# Patient Record
Sex: MALE | Race: WHITE | NOT HISPANIC OR LATINO | Employment: FULL TIME | URBAN - METROPOLITAN AREA
[De-identification: names, ages, dates, MRNs, and addresses within clinical notes are randomized per-mention and may not be internally consistent; named-entity substitution may affect disease eponyms.]

---

## 2024-06-10 ENCOUNTER — OFFICE VISIT (OUTPATIENT)
Dept: PRIMARY CARE | Facility: EXTERNAL LOCATION | Age: 44
End: 2024-06-10

## 2024-06-10 VITALS
RESPIRATION RATE: 18 BRPM | SYSTOLIC BLOOD PRESSURE: 151 MMHG | OXYGEN SATURATION: 95 % | DIASTOLIC BLOOD PRESSURE: 103 MMHG | HEART RATE: 62 BPM | TEMPERATURE: 98 F

## 2024-06-10 DIAGNOSIS — R03.0 ELEVATED BP WITHOUT DIAGNOSIS OF HYPERTENSION: ICD-10-CM
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
## 2024-06-10 RX ORDER — TRIAMCINOLONE ACETONIDE 1 MG/G
CREAM TOPICAL 2 TIMES DAILY
Qty: 15 G | Refills: 0 | Status: SHIPPED | OUTPATIENT
Start: 2024-06-10

## 2024-06-10 ASSESSMENT — ENCOUNTER SYMPTOMS
VOMITING: 0
TROUBLE SWALLOWING: 0
CHEST TIGHTNESS: 0
MYALGIAS: 0
FACIAL SWELLING: 0
FEVER: 0
SHORTNESS OF BREATH: 0
ACTIVITY CHANGE: 0
APPETITE CHANGE: 0
DIZZINESS: 0
CHILLS: 0
DIARRHEA: 0

## 2024-06-10 NOTE — PROGRESS NOTES
Subjective   Patient ID: Aryan Woods is a 43 y.o. male who presents for Insect Bite (Rt hand.  Taking benedryl but hand still swollen).    Pt presents with c/o bug bite to right hand. Has taken benadryl for the swelling x2 days with no improvement. Thinks he was bit by a fly. Mild improvement. Tightness with closing hand. Itching. No pain. No rash, numbness, tingling, fever,chills, nausea, vomiting, diarrhea, change in vision.     Incidental finding of elevated BP noted. PMH of HTN. Pt aware he is over due for annual physical.          Review of Systems   Constitutional:  Negative for activity change, appetite change, chills and fever.   HENT:  Negative for facial swelling and trouble swallowing.    Eyes:  Negative for visual disturbance.   Respiratory:  Negative for chest tightness and shortness of breath.    Cardiovascular:  Negative for chest pain.   Gastrointestinal:  Negative for diarrhea and vomiting.   Musculoskeletal:  Negative for myalgias.   Skin:  Negative for rash.   Neurological:  Negative for dizziness.       Objective   BP (!) 151/103   Pulse 62   Temp 36.7 °C (98 °F)   Resp 18   SpO2 95%     Physical Exam  Vitals and nursing note reviewed.   Eyes:      General:         Right eye: No discharge.         Left eye: No discharge.      Conjunctiva/sclera: Conjunctivae normal.      Pupils: Pupils are equal, round, and reactive to light.   Cardiovascular:      Rate and Rhythm: Normal rate and regular rhythm.      Heart sounds: No murmur heard.  Pulmonary:      Effort: Pulmonary effort is normal. No respiratory distress.      Breath sounds: No stridor. No wheezing, rhonchi or rales.   Musculoskeletal:         General: Swelling (roight hand) and tenderness present. No deformity or signs of injury.      Cervical back: Neck supple.   Lymphadenopathy:      Cervical: No cervical adenopathy.   Skin:     General: Skin is warm and dry.      Capillary Refill: Capillary refill takes less than 2 seconds.       Coloration: Skin is not jaundiced or pale.      Findings: No bruising, erythema, lesion or rash.   Neurological:      General: No focal deficit present.      Mental Status: He is alert.   Psychiatric:         Mood and Affect: Mood normal.         Behavior: Behavior normal.         Thought Content: Thought content normal.         Judgment: Judgment normal.         Assessment/Plan   Diagnoses and all orders for this visit:  Bug bite, initial encounter  -     triamcinolone (Kenalog) 0.1 % cream; Apply topically 2 times a day. Apply to affected area 1-2 times daily as needed.  Elevated BP without diagnosis of hypertension  -     Referral to Primary Care; Future    -pt encouraged to follow up with PCP regarding asymptomatic elevated BP. Education provided about long term effects of elevated BP. Verbalizes understanding.   -pt aware he is over due for annual physical. Education on importance of annual physicals provided. Pt verbalizes understanding.   -triamcinolone cream for bug bite.   -apply ice  -if s./s worsening in 2-3 days, please follow up.

## 2024-11-25 ENCOUNTER — OFFICE VISIT (OUTPATIENT)
Dept: PRIMARY CARE | Facility: CLINIC | Age: 44
End: 2024-11-25
Payer: COMMERCIAL

## 2024-11-25 ENCOUNTER — HOSPITAL ENCOUNTER (OUTPATIENT)
Dept: RADIOLOGY | Facility: CLINIC | Age: 44
Discharge: HOME | End: 2024-11-25
Payer: COMMERCIAL

## 2024-11-25 VITALS
TEMPERATURE: 96 F | OXYGEN SATURATION: 98 % | SYSTOLIC BLOOD PRESSURE: 120 MMHG | DIASTOLIC BLOOD PRESSURE: 62 MMHG | HEIGHT: 69 IN | HEART RATE: 81 BPM | WEIGHT: 185 LBS | BODY MASS INDEX: 27.4 KG/M2

## 2024-11-25 DIAGNOSIS — Z01.89 ENCOUNTER FOR ROUTINE LABORATORY TESTING: ICD-10-CM

## 2024-11-25 DIAGNOSIS — R20.2 NUMBNESS AND TINGLING: ICD-10-CM

## 2024-11-25 DIAGNOSIS — R73.9 HYPERGLYCEMIA: ICD-10-CM

## 2024-11-25 DIAGNOSIS — R20.0 NUMBNESS AND TINGLING: ICD-10-CM

## 2024-11-25 DIAGNOSIS — E78.2 MIXED HYPERLIPIDEMIA: ICD-10-CM

## 2024-11-25 DIAGNOSIS — E55.9 VITAMIN D DEFICIENCY: ICD-10-CM

## 2024-11-25 DIAGNOSIS — F41.9 ANXIETY: ICD-10-CM

## 2024-11-25 DIAGNOSIS — Z23 ENCOUNTER FOR IMMUNIZATION: ICD-10-CM

## 2024-11-25 DIAGNOSIS — R53.82 CHRONIC FATIGUE: ICD-10-CM

## 2024-11-25 DIAGNOSIS — Z00.00 ANNUAL PHYSICAL EXAM: Primary | ICD-10-CM

## 2024-11-25 PROBLEM — E78.5 HLD (HYPERLIPIDEMIA): Status: ACTIVE | Noted: 2024-11-25

## 2024-11-25 PROCEDURE — 99213 OFFICE O/P EST LOW 20 MIN: CPT | Performed by: STUDENT IN AN ORGANIZED HEALTH CARE EDUCATION/TRAINING PROGRAM

## 2024-11-25 PROCEDURE — 99396 PREV VISIT EST AGE 40-64: CPT | Performed by: STUDENT IN AN ORGANIZED HEALTH CARE EDUCATION/TRAINING PROGRAM

## 2024-11-25 PROCEDURE — 72050 X-RAY EXAM NECK SPINE 4/5VWS: CPT

## 2024-11-25 PROCEDURE — 1036F TOBACCO NON-USER: CPT | Performed by: STUDENT IN AN ORGANIZED HEALTH CARE EDUCATION/TRAINING PROGRAM

## 2024-11-25 PROCEDURE — 3008F BODY MASS INDEX DOCD: CPT | Performed by: STUDENT IN AN ORGANIZED HEALTH CARE EDUCATION/TRAINING PROGRAM

## 2024-11-25 PROCEDURE — 72050 X-RAY EXAM NECK SPINE 4/5VWS: CPT | Performed by: RADIOLOGY

## 2024-11-25 RX ORDER — MULTIVITAMIN
1 TABLET ORAL DAILY
COMMUNITY
Start: 2024-11-25

## 2024-11-25 ASSESSMENT — ENCOUNTER SYMPTOMS
PSYCHIATRIC NEGATIVE: 1
CONSTITUTIONAL NEGATIVE: 1
ENDOCRINE NEGATIVE: 1
NEUROLOGICAL NEGATIVE: 1
CARDIOVASCULAR NEGATIVE: 1
ALLERGIC/IMMUNOLOGIC NEGATIVE: 1
MUSCULOSKELETAL NEGATIVE: 1
HEMATOLOGIC/LYMPHATIC NEGATIVE: 1
GASTROINTESTINAL NEGATIVE: 1
RESPIRATORY NEGATIVE: 1
EYES NEGATIVE: 1

## 2024-11-25 ASSESSMENT — PROMIS GLOBAL HEALTH SCALE
RATE_AVERAGE_PAIN: 0
RATE_SOCIAL_SATISFACTION: FAIR
CARRYOUT_PHYSICAL_ACTIVITIES: COMPLETELY
EMOTIONAL_PROBLEMS: SOMETIMES
RATE_PHYSICAL_HEALTH: GOOD
CARRYOUT_SOCIAL_ACTIVITIES: GOOD
RATE_GENERAL_HEALTH: GOOD
RATE_AVERAGE_FATIGUE: MILD
RATE_MENTAL_HEALTH: FAIR
RATE_QUALITY_OF_LIFE: GOOD

## 2024-11-25 ASSESSMENT — PATIENT HEALTH QUESTIONNAIRE - PHQ9
1. LITTLE INTEREST OR PLEASURE IN DOING THINGS: NOT AT ALL
SUM OF ALL RESPONSES TO PHQ9 QUESTIONS 1 AND 2: 0
2. FEELING DOWN, DEPRESSED OR HOPELESS: NOT AT ALL

## 2024-11-25 ASSESSMENT — PAIN SCALES - GENERAL: PAINLEVEL_OUTOF10: 0-NO PAIN

## 2024-11-25 NOTE — PATIENT INSTRUCTIONS
- Patient noting numbness / tingling in his L-thumb and L-second digit. 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 Discussed with him that this could be related to either carpal tunnel syndrome (CTS) due to his occupation or potentially some form of radiculopathy related to cervical disc disease; although the patient denies significant neck issues or shoulder pains on either side.  - Patient noting sleep pattern changes. Waking up every night with difficulty falling back to sleep. Discussed with him that I suspect that this is related to uncontrolled anxiety / stress.  - Otherwise, the patient feels well and denies any acute symptoms / concerns at this time.  - Blood pressure at goal today.  - Encouraged continued dietary, exercise, and lifestyle modification.  - Significant medication and problem list reconciliation done today.     Discussed routine and/or preventative care with the patient as outlined below:  - Labwork:   - Patient appears to be due for labwork. Ordered today.   - Will order labwork for the patient's next appointment. Encouraged the patient to get this labwork done one week prior to the next appointment.  - Imaging:   - Patient does not appear to be due for routine imaging at this time.  - Immunizations:   - Encouraged the tetanus (TDAP) booster.  - Discussed seasonal immunizations, including the influenza and COVID-19 immunizations.

## 2024-11-25 NOTE — PROGRESS NOTES
[facility]: [facility]  PHYSICAL EXAM      Aryan Woods is a 44 y.o. male that is presenting today for Annual Exam.    Assessment/Plan   - Patient noting numbness / tingling in his L-thumb and L-second digit. 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 Discussed with him that this could be related to either carpal tunnel syndrome (CTS) due to his occupation or potentially some form of radiculopathy related to cervical disc disease; although the patient denies significant neck issues or shoulder pains on either side.  - Patient noting sleep pattern changes. Waking up every night with difficulty falling back to sleep. Discussed with him that I suspect that this is related to uncontrolled anxiety / stress.  - Otherwise, the patient feels well and denies any acute symptoms / concerns at this time.  - Blood pressure at goal today.  - Encouraged continued dietary, exercise, and lifestyle modification.  - Significant medication and problem list reconciliation done today.     Discussed routine and/or preventative care with the patient as outlined below:  - Labwork:   - Patient appears to be due for labwork. Ordered today.   - Will order labwork for the patient's next appointment. Encouraged the patient to get this labwork done one week prior to the next appointment.  - Imaging:   - Patient does not appear to be due for routine imaging at this time.  - Immunizations:   - Encouraged the tetanus (TDAP) booster.  - Discussed seasonal immunizations, including the influenza and COVID-19 immunizations.     Diagnoses and all orders for this visit:  Annual physical exam  -     Follow Up In Primary Care; Future  Encounter for routine laboratory testing  Chronic fatigue  -     CBC and Auto Differential; Future  -     Basic Metabolic Panel; Future  -     TSH with reflex to Free T4 if abnormal; Future  -     CBC and Auto Differential; Future  -     Basic Metabolic Panel; Future  -      TSH with reflex to Free T4 if abnormal; Future  Hyperglycemia  -     Hemoglobin A1C; Future  -     Hemoglobin A1C; Future  Encounter for immunization  Numbness and tingling  -     XR cervical spine 2-3 views; Future  Mixed hyperlipidemia  -     Hepatic Function Panel; Future  -     Lipid Panel; Future  -     Hepatic Function Panel; Future  -     Lipid Panel; Future  Vitamin D deficiency  -     multivitamin tablet; Take 1 tablet by mouth once daily.  -     Vitamin D 25-Hydroxy,Total (for eval of Vitamin D levels); Future  -     Vitamin D 25-Hydroxy,Total (for eval of Vitamin D levels); Future  Anxiety  -     multivitamin tablet; Take 1 tablet by mouth once daily.    Current Outpatient Medications   Medication Instructions    multivitamin tablet 1 tablet, oral, Daily    triamcinolone (Kenalog) 0.1 % cream Topical, 2 times daily, Apply to affected area 1-2 times daily as needed.     Subjective   - The patient otherwise feels well and denies any acute symptoms or concerns at this time.  - The patient denies any changes or progression of their chronic medical problems.  - The patient denies any problems or concerns with their medications.      Review of Systems   Constitutional: Negative.    HENT: Negative.     Eyes: Negative.    Respiratory: Negative.     Cardiovascular: Negative.    Gastrointestinal: Negative.    Endocrine: Negative.    Genitourinary: Negative.    Musculoskeletal: Negative.    Skin: Negative.    Allergic/Immunologic: Negative.    Neurological: Negative.    Hematological: Negative.    Psychiatric/Behavioral: Negative.     All other systems reviewed and are negative.     Objective   Vitals:    11/25/24 1607   BP: 120/62   Pulse: 81   Temp: 35.6 °C (96 °F)   SpO2: 98%     Body mass index is 27.32 kg/m².  Physical Exam  Vitals and nursing note reviewed.   Constitutional:       General: He is not in acute distress.     Appearance: Normal appearance. He is not ill-appearing.   HENT:      Head:  Normocephalic and atraumatic.      Right Ear: Tympanic membrane, ear canal and external ear normal. There is no impacted cerumen.      Left Ear: Tympanic membrane, ear canal and external ear normal. There is no impacted cerumen.      Nose: Nose normal.      Mouth/Throat:      Mouth: Mucous membranes are moist.      Pharynx: Oropharynx is clear. No oropharyngeal exudate or posterior oropharyngeal erythema.   Eyes:      General: No scleral icterus.        Right eye: No discharge.         Left eye: No discharge.      Extraocular Movements: Extraocular movements intact.      Conjunctiva/sclera: Conjunctivae normal.      Pupils: Pupils are equal, round, and reactive to light.   Neck:      Vascular: No carotid bruit.   Cardiovascular:      Rate and Rhythm: Normal rate and regular rhythm.      Pulses: Normal pulses.      Heart sounds: Normal heart sounds. No murmur heard.     No friction rub. No gallop.   Pulmonary:      Effort: Pulmonary effort is normal. No respiratory distress.      Breath sounds: Normal breath sounds.   Abdominal:      General: Abdomen is flat. Bowel sounds are normal.      Palpations: Abdomen is soft.      Tenderness: There is no abdominal tenderness.      Hernia: No hernia is present.   Musculoskeletal:         General: No swelling. Normal range of motion.      Cervical back: Normal range of motion.   Lymphadenopathy:      Cervical: No cervical adenopathy.   Skin:     General: Skin is warm and dry.      Coloration: Skin is not jaundiced.      Findings: No rash.   Neurological:      General: No focal deficit present.      Mental Status: He is alert and oriented to person, place, and time. Mental status is at baseline.   Psychiatric:         Mood and Affect: Mood normal.         Behavior: Behavior normal.       Diagnostic Results   Lab Results   Component Value Date    GLUCOSE 95 03/16/2023    CALCIUM 9.7 03/16/2023     03/16/2023    K 4.2 03/16/2023    CO2 24 03/16/2023     03/16/2023     "BUN 9 03/16/2023    CREATININE 1.0 03/16/2023     Lab Results   Component Value Date    ALT 21 03/16/2023    AST 20 03/16/2023    ALKPHOS 100 03/16/2023    BILITOT 0.5 03/16/2023     Lab Results   Component Value Date    WBC 6.8 03/16/2023    HGB 15.3 03/16/2023    HCT 46.1 03/16/2023    MCV 87.1 03/16/2023     03/16/2023     Lab Results   Component Value Date    CHOL 195 03/16/2023     Lab Results   Component Value Date    HDL 33 (L) 03/16/2023     Lab Results   Component Value Date    LDLCALC 136 (H) 03/16/2023     Lab Results   Component Value Date    TRIG 131 03/16/2023     No components found for: \"CHOLHDL\"  Lab Results   Component Value Date    HGBA1C 5.5 03/16/2023     Other labs not included in the list above were reviewed either before or during this encounter.    History   Past Medical History:   Diagnosis Date    Headache     Hypertension      History reviewed. No pertinent surgical history.  No family history on file.  Social History     Socioeconomic History    Marital status: Single     Spouse name: Not on file    Number of children: Not on file    Years of education: Not on file    Highest education level: Not on file   Occupational History    Not on file   Tobacco Use    Smoking status: Never    Smokeless tobacco: Never   Substance and Sexual Activity    Alcohol use: Yes     Alcohol/week: 6.0 standard drinks of alcohol     Types: 4 Glasses of wine, 2 Cans of beer per week    Drug use: Never    Sexual activity: Yes     Partners: Female     Birth control/protection: Condom Male   Other Topics Concern    Not on file   Social History Narrative    Not on file     Social Drivers of Health     Financial Resource Strain: Not on file   Food Insecurity: Not on file   Transportation Needs: Not on file   Physical Activity: Not on file   Stress: Not on file   Social Connections: Not on file   Intimate Partner Violence: Not on file   Housing Stability: Not on file     Allergies   Allergen Reactions    Bee " Venom Protein (Honey Bee) Anaphylaxis     Patient states he gets swollen and has shortness of breath.     Current Outpatient Medications on File Prior to Visit   Medication Sig Dispense Refill    triamcinolone (Kenalog) 0.1 % cream Apply topically 2 times a day. Apply to affected area 1-2 times daily as needed. 15 g 0     No current facility-administered medications on file prior to visit.     Immunization History   Administered Date(s) Administered    Pfizer Purple Cap SARS-CoV-2 02/26/2021, 03/26/2021, 10/05/2021     Patient's medical history was reviewed and updated either before or during this encounter.       Dell Cisneros MD

## 2024-11-26 ENCOUNTER — LAB (OUTPATIENT)
Dept: LAB | Facility: LAB | Age: 44
End: 2024-11-26
Payer: COMMERCIAL

## 2024-11-26 DIAGNOSIS — R53.82 CHRONIC FATIGUE: ICD-10-CM

## 2024-11-26 DIAGNOSIS — R73.9 HYPERGLYCEMIA: ICD-10-CM

## 2024-11-26 DIAGNOSIS — E78.2 MIXED HYPERLIPIDEMIA: ICD-10-CM

## 2024-11-26 DIAGNOSIS — E55.9 VITAMIN D DEFICIENCY: ICD-10-CM

## 2024-11-26 LAB
25(OH)D3 SERPL-MCNC: 16 NG/ML (ref 30–100)
ALBUMIN SERPL BCP-MCNC: 4.4 G/DL (ref 3.4–5)
ALP SERPL-CCNC: 63 U/L (ref 33–120)
ALT SERPL W P-5'-P-CCNC: 14 U/L (ref 10–52)
ANION GAP SERPL CALCULATED.3IONS-SCNC: 11 MMOL/L (ref 10–20)
AST SERPL W P-5'-P-CCNC: 16 U/L (ref 9–39)
BASOPHILS # BLD AUTO: 0.04 X10*3/UL (ref 0–0.1)
BASOPHILS NFR BLD AUTO: 0.7 %
BILIRUB DIRECT SERPL-MCNC: 0.1 MG/DL (ref 0–0.3)
BILIRUB SERPL-MCNC: 0.6 MG/DL (ref 0–1.2)
BUN SERPL-MCNC: 13 MG/DL (ref 6–23)
CALCIUM SERPL-MCNC: 9.8 MG/DL (ref 8.6–10.3)
CHLORIDE SERPL-SCNC: 102 MMOL/L (ref 98–107)
CHOLEST SERPL-MCNC: 182 MG/DL (ref 0–199)
CHOLEST/HDLC SERPL: 5.2 {RATIO}
CO2 SERPL-SCNC: 31 MMOL/L (ref 21–32)
CREAT SERPL-MCNC: 1.11 MG/DL (ref 0.5–1.3)
EGFRCR SERPLBLD CKD-EPI 2021: 84 ML/MIN/1.73M*2
EOSINOPHIL # BLD AUTO: 0.02 X10*3/UL (ref 0–0.7)
EOSINOPHIL NFR BLD AUTO: 0.4 %
ERYTHROCYTE [DISTWIDTH] IN BLOOD BY AUTOMATED COUNT: 12.5 % (ref 11.5–14.5)
EST. AVERAGE GLUCOSE BLD GHB EST-MCNC: 103 MG/DL
GLUCOSE SERPL-MCNC: 92 MG/DL (ref 74–99)
HBA1C MFR BLD: 5.2 %
HCT VFR BLD AUTO: 45.1 % (ref 41–52)
HDLC SERPL-MCNC: 35.1 MG/DL
HGB BLD-MCNC: 14.7 G/DL (ref 13.5–17.5)
IMM GRANULOCYTES # BLD AUTO: 0.01 X10*3/UL (ref 0–0.7)
IMM GRANULOCYTES NFR BLD AUTO: 0.2 % (ref 0–0.9)
LDLC SERPL CALC-MCNC: 130 MG/DL
LYMPHOCYTES # BLD AUTO: 1.54 X10*3/UL (ref 1.2–4.8)
LYMPHOCYTES NFR BLD AUTO: 28.7 %
MCH RBC QN AUTO: 29.1 PG (ref 26–34)
MCHC RBC AUTO-ENTMCNC: 32.6 G/DL (ref 32–36)
MCV RBC AUTO: 89 FL (ref 80–100)
MONOCYTES # BLD AUTO: 0.45 X10*3/UL (ref 0.1–1)
MONOCYTES NFR BLD AUTO: 8.4 %
NEUTROPHILS # BLD AUTO: 3.3 X10*3/UL (ref 1.2–7.7)
NEUTROPHILS NFR BLD AUTO: 61.6 %
NON HDL CHOLESTEROL: 147 MG/DL (ref 0–149)
NRBC BLD-RTO: 0 /100 WBCS (ref 0–0)
PLATELET # BLD AUTO: 315 X10*3/UL (ref 150–450)
POTASSIUM SERPL-SCNC: 4.5 MMOL/L (ref 3.5–5.3)
PROT SERPL-MCNC: 7.4 G/DL (ref 6.4–8.2)
RBC # BLD AUTO: 5.06 X10*6/UL (ref 4.5–5.9)
SODIUM SERPL-SCNC: 139 MMOL/L (ref 136–145)
TRIGL SERPL-MCNC: 84 MG/DL (ref 0–149)
TSH SERPL-ACNC: 1.31 MIU/L (ref 0.44–3.98)
VLDL: 17 MG/DL (ref 0–40)
WBC # BLD AUTO: 5.4 X10*3/UL (ref 4.4–11.3)

## 2024-11-26 PROCEDURE — 36415 COLL VENOUS BLD VENIPUNCTURE: CPT

## 2024-11-26 PROCEDURE — 80061 LIPID PANEL: CPT

## 2024-11-26 PROCEDURE — 85025 COMPLETE CBC W/AUTO DIFF WBC: CPT

## 2024-11-26 PROCEDURE — 82248 BILIRUBIN DIRECT: CPT

## 2024-11-26 PROCEDURE — 80053 COMPREHEN METABOLIC PANEL: CPT

## 2024-11-26 PROCEDURE — 83036 HEMOGLOBIN GLYCOSYLATED A1C: CPT

## 2024-11-26 PROCEDURE — 82306 VITAMIN D 25 HYDROXY: CPT

## 2024-11-26 PROCEDURE — 84443 ASSAY THYROID STIM HORMONE: CPT

## 2024-11-27 DIAGNOSIS — M50.90 CERVICAL DISC DISEASE: ICD-10-CM

## 2024-11-27 DIAGNOSIS — R20.0 NUMBNESS AND TINGLING: Primary | ICD-10-CM

## 2024-11-27 DIAGNOSIS — E55.9 VITAMIN D DEFICIENCY: Primary | ICD-10-CM

## 2024-11-27 DIAGNOSIS — R20.2 NUMBNESS AND TINGLING: Primary | ICD-10-CM

## 2024-11-27 RX ORDER — ERGOCALCIFEROL 1.25 MG/1
50000 CAPSULE ORAL WEEKLY
Qty: 12 CAPSULE | Refills: 0 | Status: SHIPPED | OUTPATIENT
Start: 2024-11-27 | End: 2025-02-19

## 2024-12-04 ENCOUNTER — APPOINTMENT (OUTPATIENT)
Dept: NEUROSURGERY | Facility: HOSPITAL | Age: 44
End: 2024-12-04
Payer: COMMERCIAL

## 2024-12-04 VITALS
BODY MASS INDEX: 27.4 KG/M2 | HEIGHT: 69 IN | RESPIRATION RATE: 16 BRPM | SYSTOLIC BLOOD PRESSURE: 141 MMHG | WEIGHT: 185 LBS | HEART RATE: 84 BPM | DIASTOLIC BLOOD PRESSURE: 95 MMHG

## 2024-12-04 DIAGNOSIS — M47.12 CERVICAL SPONDYLOSIS WITH MYELOPATHY: Primary | ICD-10-CM

## 2024-12-04 PROCEDURE — 3008F BODY MASS INDEX DOCD: CPT | Performed by: NEUROLOGICAL SURGERY

## 2024-12-04 PROCEDURE — 99202 OFFICE O/P NEW SF 15 MIN: CPT | Performed by: NEUROLOGICAL SURGERY

## 2024-12-04 PROCEDURE — 99212 OFFICE O/P EST SF 10 MIN: CPT | Performed by: NEUROLOGICAL SURGERY

## 2024-12-04 PROCEDURE — 1036F TOBACCO NON-USER: CPT | Performed by: NEUROLOGICAL SURGERY

## 2024-12-04 ASSESSMENT — ENCOUNTER SYMPTOMS
GASTROINTESTINAL NEGATIVE: 1
ALLERGIC/IMMUNOLOGIC NEGATIVE: 1
EYES NEGATIVE: 1
CONSTITUTIONAL NEGATIVE: 1
RESPIRATORY NEGATIVE: 1
ENDOCRINE NEGATIVE: 1
BACK PAIN: 1
PSYCHIATRIC NEGATIVE: 1
NUMBNESS: 1
CARDIOVASCULAR NEGATIVE: 1
HEMATOLOGIC/LYMPHATIC NEGATIVE: 1

## 2024-12-04 NOTE — PROGRESS NOTES
"Here today having numbness in the left arm and hand and the index, middle and ring finger. Has gone to the right arm . Last week it went limp. This has been for 6 months. Has not had PT or pain management.    44-year-old male presents for evaluation of bilateral arm numbness.  Has been going on for several months.  He denies pain in his neck or pain running down his arms.  At 1 point while he was straining, he felt that his complete right arm go numb.  He has also had times when he feels like his left leg is giving out on him.    Review of Systems   Constitutional: Negative.    HENT: Negative.     Eyes: Negative.    Respiratory: Negative.     Cardiovascular: Negative.    Gastrointestinal: Negative.    Endocrine: Negative.    Genitourinary: Negative.    Musculoskeletal:  Positive for back pain.   Skin: Negative.    Allergic/Immunologic: Negative.    Neurological:  Positive for numbness.   Hematological: Negative.    Psychiatric/Behavioral: Negative.         Visit Vitals  BP (!) 141/95   Pulse 84   Resp 16   Ht 1.753 m (5' 9\")   Wt 83.9 kg (185 lb)   BMI 27.32 kg/m²   Smoking Status Never   BSA 2.02 m²           Current Outpatient Medications:     ergocalciferol (Vitamin D-2) 1.25 MG (43233 UT) capsule, Take 1 capsule (50,000 Units) by mouth 1 (one) time per week., Disp: 12 capsule, Rfl: 0    multivitamin tablet, Take 1 tablet by mouth once daily., Disp: , Rfl:     triamcinolone (Kenalog) 0.1 % cream, Apply topically 2 times a day. Apply to affected area 1-2 times daily as needed., Disp: 15 g, Rfl: 0      Objective   Neurological Exam    On physical exam, the patient is alert and interactive.  Strength is full throughout.  He has diffusely increased tone.  He is hyperreflexic throughout.  Prince signs are present in both hands and clonus is present in both ankles.    Cervical spine x-rays that I personally reviewed it demonstrate evidence of spondylosis with bone spur formation.    The patient has cervical " myelopathy based on his clinical exam.  I like to obtain MRI of his cervical spine without contrast to look for ever events of spinal cord compression that explains this and see if any surgical intervention is warranted..  Referral made for physical therapy.

## 2024-12-06 ENCOUNTER — HOSPITAL ENCOUNTER (OUTPATIENT)
Dept: RADIOLOGY | Facility: CLINIC | Age: 44
Discharge: HOME | End: 2024-12-06
Payer: COMMERCIAL

## 2024-12-06 DIAGNOSIS — M47.12 CERVICAL SPONDYLOSIS WITH MYELOPATHY: ICD-10-CM

## 2024-12-06 PROCEDURE — 72141 MRI NECK SPINE W/O DYE: CPT

## 2024-12-10 ENCOUNTER — PREP FOR PROCEDURE (OUTPATIENT)
Dept: NEUROSURGERY | Facility: HOSPITAL | Age: 44
End: 2024-12-10
Payer: COMMERCIAL

## 2024-12-10 DIAGNOSIS — G95.9 CERVICAL MYELOPATHY: Primary | ICD-10-CM

## 2024-12-16 ENCOUNTER — APPOINTMENT (OUTPATIENT)
Dept: NEUROSURGERY | Facility: CLINIC | Age: 44
End: 2024-12-16
Payer: COMMERCIAL

## 2024-12-16 VITALS
BODY MASS INDEX: 26.64 KG/M2 | RESPIRATION RATE: 15 BRPM | TEMPERATURE: 98.1 F | DIASTOLIC BLOOD PRESSURE: 104 MMHG | HEART RATE: 75 BPM | WEIGHT: 179.9 LBS | SYSTOLIC BLOOD PRESSURE: 149 MMHG | HEIGHT: 69 IN

## 2024-12-16 DIAGNOSIS — R20.0 NUMBNESS AND TINGLING: ICD-10-CM

## 2024-12-16 DIAGNOSIS — M50.90 CERVICAL DISC DISEASE: ICD-10-CM

## 2024-12-16 DIAGNOSIS — M47.12 CERVICAL SPONDYLOSIS WITH MYELOPATHY: Primary | ICD-10-CM

## 2024-12-16 DIAGNOSIS — R20.2 NUMBNESS AND TINGLING: ICD-10-CM

## 2024-12-16 PROCEDURE — 99212 OFFICE O/P EST SF 10 MIN: CPT | Performed by: NEUROLOGICAL SURGERY

## 2024-12-16 PROCEDURE — 1036F TOBACCO NON-USER: CPT | Performed by: NEUROLOGICAL SURGERY

## 2024-12-16 PROCEDURE — 3008F BODY MASS INDEX DOCD: CPT | Performed by: NEUROLOGICAL SURGERY

## 2024-12-16 ASSESSMENT — PAIN SCALES - GENERAL: PAINLEVEL_OUTOF10: 0-NO PAIN

## 2024-12-16 NOTE — PROGRESS NOTES
Hx of numbness in the left arm and hand and the index, middle and ring finger. Has gone to the right arm. Had MRI and to see PT in January.     44-year-old man with cervical myelopathy returns for follow-up.  He denies any changes in his symptoms.  On physical exam, he is alert and interactive.  He moves all extremities with full strength.  He continues to have diffuse hyperreflexia.    An MRI of his cervical spine that I personally reviewed demonstrates a C4-5 disc bulge that results in severe canal stenosis and cord compression.    I again reviewed these findings with the patient.  I have offered him a C4-5 anterior cervical discectomy and fusion with donor bone and plating to take the pressure off the spinal cord.  We reviewed the risks and benefits of this procedure today.  Specifically, we discussed that the primary objective of surgery is to arrest any further deterioration.  Is unclear how much of his symptoms will improve.  The patient will contact my office when he makes a final decision about surgery.

## 2024-12-17 PROBLEM — G95.9 CERVICAL MYELOPATHY: Status: ACTIVE | Noted: 2024-12-10

## 2024-12-19 ENCOUNTER — APPOINTMENT (OUTPATIENT)
Dept: RADIOLOGY | Facility: CLINIC | Age: 44
End: 2024-12-19
Payer: COMMERCIAL

## 2024-12-23 DIAGNOSIS — M50.90 CERVICAL DISC DISEASE: ICD-10-CM

## 2024-12-23 DIAGNOSIS — M47.12 CERVICAL SPONDYLOSIS WITH MYELOPATHY: ICD-10-CM

## 2024-12-23 DIAGNOSIS — R20.2 NUMBNESS AND TINGLING: ICD-10-CM

## 2024-12-23 DIAGNOSIS — R20.0 NUMBNESS AND TINGLING: ICD-10-CM

## 2024-12-24 ENCOUNTER — APPOINTMENT (OUTPATIENT)
Dept: PRIMARY CARE | Facility: CLINIC | Age: 44
End: 2024-12-24
Payer: COMMERCIAL

## 2024-12-26 ENCOUNTER — APPOINTMENT (OUTPATIENT)
Dept: PRIMARY CARE | Facility: CLINIC | Age: 44
End: 2024-12-26
Payer: COMMERCIAL

## 2025-01-02 ENCOUNTER — CLINICAL SUPPORT (OUTPATIENT)
Dept: PREADMISSION TESTING | Facility: HOSPITAL | Age: 45
End: 2025-01-02
Payer: COMMERCIAL

## 2025-01-02 NOTE — CPM/PAT H&P
"CPM/PAT Evaluation       Name: Aryan Woods (Aryan Woods \"Viral\")  /Age: 1980/44 y.o.     {[facility] Visit Type:20802}      Chief Complaint: ***    HPI    Past Medical History:   Diagnosis Date    Anxiety     Cervical myelopathy 2024    C4-5 disc bulge    Headache     Herpes zoster     left forehead    Hyperglycemia     Hyperlipidemia     Hypertension     Vitamin D deficiency 2024       Past Surgical History:   Procedure Laterality Date    FIBULA FRACTURE SURGERY Left 2022    OTHER SURGICAL HISTORY      cyst removal on forehead       Patient  reports being sexually active and has had partner(s) who are female. He reports using the following method of birth control/protection: Condom Male.    No family history on file.    Allergies   Allergen Reactions    Bee Venom Protein (Honey Bee) Anaphylaxis     Patient states he gets swollen and has shortness of breath.       Prior to Admission medications    Medication Sig Start Date End Date Taking? Authorizing Provider   ergocalciferol (Vitamin D-2) 1.25 MG (90179 UT) capsule Take 1 capsule (50,000 Units) by mouth 1 (one) time per week. 24  Dell Cisneros MD   multivitamin tablet Take 1 tablet by mouth once daily. 24   Dell Cisneros MD   triamcinolone (Kenalog) 0.1 % cream Apply topically 2 times a day. Apply to affected area 1-2 times daily as needed. 6/10/24   GAVIN Valles-CNP        PAT ROS     PAT Physical Exam     Airway    Testing/Diagnostic:   MRI Spine 24  IMPRESSION:  * Canal and foraminal narrowing as described above  *At C4/C5, there is disc osteophyte complex with canal stenosis and  cord compression as well as associated signal change within the cord  consistent with compressive myelomalacia    24  - Hemoglobin A1C 5.2    Patient Specialist/PCP:   Data only, unable to reach patient, left VM. No medication, providers, or history verified. Information updated based " solely on chart review.        PCP  11/25/24 - Dell Cisneros MD   Annual exam, chronic fatigue, hyperglycemia, hyperlipidemia, anxiety, Vit D def.  Results note - 11/27/24 -   - Vitamin D remains significantly low. Continue multivitamin. Start Vitamin D 2,000u/day OTC. Will send three month prescription for Vitamin D as well.  - Mild cholesterol abnormalities, unchanged from prior. Does not require medication.  - Remainder of labwork unremarkable.    Neuro Surg  12/16/24 - Shaheen Garcia MD   Cervical myelopathy, diffuse hyperreflexia, C4-5 disc bulge that results in severe canal stenosis and cord compression documented by MRI      Visit Vitals  Smoking Status Never       DASI Risk Score      Flowsheet Row Questionnaire Series Submission from 12/18/2024 in [facility] with Generic Provider Christina   Can you take care of yourself (eat, dress, bathe, or use toilet)?  2.75  filed at 12/18/2024 1516   Can you walk indoors, such as around your house? 1.75  filed at 12/18/2024 1516   Can you walk a block or two on level ground?  2.75  filed at 12/18/2024 1516   Can you climb a flight of stairs or walk up a hill? 5.5  filed at 12/18/2024 1516   Can you run a short distance? 8  filed at 12/18/2024 1516   Can you do light work around the house like dusting or washing dishes? 2.7  filed at 12/18/2024 1516   Can you do moderate work around the house like vacuuming, sweeping floors or carrying groceries? 3.5  filed at 12/18/2024 1516   Can you do heavy work around the house like scrubbing floors or lifting and moving heavy furniture?  8  filed at 12/18/2024 1516   Can you do yard work like raking leaves, weeding or pushing a mower? 4.5  filed at 12/18/2024 1516   Can you have sexual relations? 5.25  filed at 12/18/2024 1516   Can you participate in moderate recreational activities like golf, bowling, dancing, doubles tennis or throwing a baseball or football? 0  filed at 12/18/2024 1516   Can you participate in strenous  sports like swimming, singles tennis, football, basketball, or skiing? 0  filed at 12/18/2024 1516   DASI SCORE 44.7  filed at 12/18/2024 1516   METS Score (Will be calculated only when all the questions are answered) 8.2  filed at 12/18/2024 1516          Caprini DVT Assessment    No data to display       Modified Frailty Index    No data to display       CHADS2 Stroke Risk         N/A 3 to 100%: High Risk   2 to < 3%: Medium Risk   0 to < 2%: Low Risk     Last Change: N/A          This score determines the patient's risk of having a stroke if the patient has atrial fibrillation.        This score is not applicable to this patient. Components are not calculated.          Revised Cardiac Risk Index    No data to display       Apfel Simplified Score    No data to display       Risk Analysis Index Results This Encounter    No data found in the last 10 encounters.       Stop Bang Score      Flowsheet Row Questionnaire Series Submission from 12/18/2024 in [facility] with Generic Provider Christina   Do you snore loudly? 0  filed at 12/18/2024 1516   Do you often feel tired or fatigued after your sleep? 1  filed at 12/18/2024 1516   Has anyone ever observed you stop breathing in your sleep? 0  filed at 12/18/2024 1516   Do you have or are you being treated for high blood pressure? 0  filed at 12/18/2024 1516   Recent BMI (Calculated) 26.6  filed at 12/18/2024 1516   Is BMI greater than 35 kg/m2? 0=No  filed at 12/18/2024 1516   Age older than 50 years old? 0=No  filed at 12/18/2024 1516   Gender - Male 1=Yes  filed at 12/18/2024 1516          Prodigy: High Risk  Total Score: 8              Prodigy Gender Score          ARISCAT Score for Postoperative Pulmonary Complications    No data to display       Nguyen Perioperative Risk for Myocardial Infarction or Cardiac Arrest (GRETA)    No data to display         Assessment and Plan:     {[facility] EMBEDDED ASSESSMENT AND PLAN:65571}

## 2025-01-06 ENCOUNTER — APPOINTMENT (OUTPATIENT)
Dept: NEUROSURGERY | Facility: CLINIC | Age: 45
End: 2025-01-06
Payer: COMMERCIAL

## 2025-01-09 ENCOUNTER — PRE-ADMISSION TESTING (OUTPATIENT)
Dept: PREADMISSION TESTING | Facility: HOSPITAL | Age: 45
End: 2025-01-09
Payer: COMMERCIAL

## 2025-01-09 VITALS — HEART RATE: 68 BPM | BODY MASS INDEX: 26.96 KG/M2 | TEMPERATURE: 98.1 F | WEIGHT: 182 LBS | HEIGHT: 69 IN

## 2025-01-09 DIAGNOSIS — R20.2 NUMBNESS AND TINGLING: ICD-10-CM

## 2025-01-09 DIAGNOSIS — M47.12 CERVICAL SPONDYLOSIS WITH MYELOPATHY: Primary | ICD-10-CM

## 2025-01-09 DIAGNOSIS — R20.0 NUMBNESS AND TINGLING: ICD-10-CM

## 2025-01-09 DIAGNOSIS — M50.90 CERVICAL DISC DISEASE: ICD-10-CM

## 2025-01-09 DIAGNOSIS — I10 HYPERTENSION, UNSPECIFIED TYPE: ICD-10-CM

## 2025-01-09 LAB
ABO GROUP (TYPE) IN BLOOD: NORMAL
ANION GAP SERPL CALC-SCNC: 12 MMOL/L (ref 10–20)
ANTIBODY SCREEN: NORMAL
BUN SERPL-MCNC: 14 MG/DL (ref 6–23)
CALCIUM SERPL-MCNC: 10.1 MG/DL (ref 8.6–10.6)
CHLORIDE SERPL-SCNC: 102 MMOL/L (ref 98–107)
CO2 SERPL-SCNC: 29 MMOL/L (ref 21–32)
CREAT SERPL-MCNC: 0.94 MG/DL (ref 0.5–1.3)
EGFRCR SERPLBLD CKD-EPI 2021: >90 ML/MIN/1.73M*2
ERYTHROCYTE [DISTWIDTH] IN BLOOD BY AUTOMATED COUNT: 12.2 % (ref 11.5–14.5)
GLUCOSE SERPL-MCNC: 104 MG/DL (ref 74–99)
HCT VFR BLD AUTO: 46 % (ref 41–52)
HGB BLD-MCNC: 15.2 G/DL (ref 13.5–17.5)
MCH RBC QN AUTO: 28.5 PG (ref 26–34)
MCHC RBC AUTO-ENTMCNC: 33 G/DL (ref 32–36)
MCV RBC AUTO: 86 FL (ref 80–100)
NRBC BLD-RTO: 0 /100 WBCS (ref 0–0)
PLATELET # BLD AUTO: 281 X10*3/UL (ref 150–450)
POTASSIUM SERPL-SCNC: 4.2 MMOL/L (ref 3.5–5.3)
RBC # BLD AUTO: 5.33 X10*6/UL (ref 4.5–5.9)
RH FACTOR (ANTIGEN D): NORMAL
SODIUM SERPL-SCNC: 139 MMOL/L (ref 136–145)
WBC # BLD AUTO: 5.1 X10*3/UL (ref 4.4–11.3)

## 2025-01-09 PROCEDURE — 93010 ELECTROCARDIOGRAM REPORT: CPT | Performed by: INTERNAL MEDICINE

## 2025-01-09 PROCEDURE — 87081 CULTURE SCREEN ONLY: CPT

## 2025-01-09 PROCEDURE — 99204 OFFICE O/P NEW MOD 45 MIN: CPT | Performed by: NURSE PRACTITIONER

## 2025-01-09 PROCEDURE — 36415 COLL VENOUS BLD VENIPUNCTURE: CPT

## 2025-01-09 PROCEDURE — 85027 COMPLETE CBC AUTOMATED: CPT

## 2025-01-09 PROCEDURE — 82374 ASSAY BLOOD CARBON DIOXIDE: CPT

## 2025-01-09 PROCEDURE — 86901 BLOOD TYPING SEROLOGIC RH(D): CPT

## 2025-01-09 PROCEDURE — 93005 ELECTROCARDIOGRAM TRACING: CPT

## 2025-01-09 RX ORDER — CHLORHEXIDINE GLUCONATE 40 MG/ML
SOLUTION TOPICAL DAILY PRN
Qty: 473 ML | Refills: 0 | Status: SHIPPED | OUTPATIENT
Start: 2025-01-09 | End: 2025-01-14

## 2025-01-09 RX ORDER — CHLORHEXIDINE GLUCONATE ORAL RINSE 1.2 MG/ML
15 SOLUTION DENTAL AS NEEDED
Qty: 473 ML | Refills: 0 | Status: SHIPPED | OUTPATIENT
Start: 2025-01-09 | End: 2025-01-11

## 2025-01-09 ASSESSMENT — ENCOUNTER SYMPTOMS
NUMBNESS: 1
CARDIOVASCULAR NEGATIVE: 1
WEAKNESS: 1
ENDOCRINE NEGATIVE: 1
NECK STIFFNESS: 1
GASTROINTESTINAL NEGATIVE: 1
EYES NEGATIVE: 1
RESPIRATORY NEGATIVE: 1
CONSTITUTIONAL NEGATIVE: 1

## 2025-01-09 ASSESSMENT — DUKE ACTIVITY SCORE INDEX (DASI)
CAN YOU WALK INDOORS, SUCH AS AROUND YOUR HOUSE: YES
CAN YOU DO YARD WORK LIKE RAKING LEAVES, WEEDING OR PUSHING A MOWER: YES
CAN YOU CLIMB A FLIGHT OF STAIRS OR WALK UP A HILL: YES
CAN YOU RUN A SHORT DISTANCE: YES
CAN YOU DO HEAVY WORK AROUND THE HOUSE LIKE SCRUBBING FLOORS OR LIFTING AND MOVING HEAVY FURNITURE: YES
CAN YOU DO LIGHT WORK AROUND THE HOUSE LIKE DUSTING OR WASHING DISHES: YES
CAN YOU DO MODERATE WORK AROUND THE HOUSE LIKE VACUUMING, SWEEPING FLOORS OR CARRYING GROCERIES: YES
CAN YOU TAKE CARE OF YOURSELF (EAT, DRESS, BATHE, OR USE TOILET): YES
CAN YOU WALK A BLOCK OR TWO ON LEVEL GROUND: YES
CAN YOU PARTICIPATE IN STRENOUS SPORTS LIKE SWIMMING, SINGLES TENNIS, FOOTBALL, BASKETBALL, OR SKIING: YES
DASI METS SCORE: 9.9
CAN YOU PARTICIPATE IN MODERATE RECREATIONAL ACTIVITIES LIKE GOLF, BOWLING, DANCING, DOUBLES TENNIS OR THROWING A BASEBALL OR FOOTBALL: YES
CAN YOU HAVE SEXUAL RELATIONS: YES
TOTAL_SCORE: 58.2

## 2025-01-09 ASSESSMENT — LIFESTYLE VARIABLES: SMOKING_STATUS: NONSMOKER

## 2025-01-09 NOTE — PREPROCEDURE INSTRUCTIONS
Fasting Guidelines    NPO Instructions:    Do not eat any food after midnight the night before your surgery/procedure.  You may have up to TEN ounces of clear liquids until TWO hours before your instructed arrival time to the hospital. This includes water, black tea/coffee, (no milk or cream), apple juice, and/or electrolyte drinks (Gatorade).  You may chew gum up to TWO hours before your surgery/procedure.    Additional Instructions:     We have sent a prescription for Hibiclens soap and Peridex mouth wash to your preferred pharmacy.  If you have not already, Please  your prescription and start using as directed before surgery.  Follow the instruction sheet provided to you at your CPM/PAT appointment.    Avoid herbal supplements, multivitamins and NSAIDS (non-steroidal anti-inflammatory drugs) such as Advil, Aleve, Ibuprofen, Naproxen, Excedrin, Meloxicam or Celebrex for at least 7 days prior to surgery. May take Tylenol as needed.    Avoid tobacco and alcohol products for 24 hours prior to surgery.    CONTACT SURGEON'S OFFICE IF YOU DEVELOP:  * Fever = 100.4 F   * New respiratory symptoms (e.g. cough, shortness of breath, respiratory distress, sore throat)  * Recent loss of taste or smell  *Flu like symptoms such as headache, fatigue or gastrointestinal symptoms  * You develop any open sores, shingles, burning or painful urination   AND/OR:  * You no longer wish to have the surgery.  * Any other personal circumstances change that may lead to the need to cancel or defer this surgery.  *You were admitted to any hospital within one week of your planned procedure.    Seven/Six Days before Surgery:  Review your medication instructions, stop indicated medications    Day of Surgery:  Review your medication instructions, take indicated medications  Wear comfortable loose fitting clothing  Do not use moisturizers, creams, lotions or perfume  All jewelry and valuables should be left at home      [facility]  [phone]           Patient Information: Pre-Operative Infection Prevention Measures     Why did I have my nose, under my arms, and groin swabbed?  The purpose of the swab is to identify Staphylococcus aureus inside your nose or on your skin.  The swab was sent to the laboratory for culture.  A positive swab/culture for Staphylococcus aureus is called colonization or carriage.      What is Staphylococcus aureus?  Staphylococcus aureus, also known as “staph”, is a germ found on the skin or in the nose of healthy people.  Sometimes Staphylococcus aureus can get into the body and cause an infection.  This can be minor (such as pimples, boils, or other skin problems).  It might also be serious (such as a blood infection, pneumonia, or a surgical site infection).    What is Staphylococcus aureus colonization or carriage?  Colonization or carriage means that a person has the germ but is not sick from it.  These bacteria can be spread on the hands or when breathing or sneezing.    How is Staphylococcus aureus spread?  It is most often spread by close contact with a person or item that carries it.    What happens if my culture is positive for Staphylococcus aureus?  Your doctor/medical team will use this information to guide any antibiotic treatment which may be necessary.  Regardless of the culture results, we will clean the inside of your nose with a betadine swab just before you have your surgery.      Will I get an infection if I have Staphylococcus aureus in my nose or on my skin?  Anyone can get an infection with Staphylococcus aureus.  However, the best way to reduce your risk of infection is to follow the instructions provided to you for the use of your CHG soap and dental rinse.        Patient Information: Oral/Dental Rinse    What is oral/dental rinse?   It is a mouthwash. It is a way of cleaning the mouth with a germ-killing solution before your surgery.  The solution contains  chlorhexidine, commonly known as CHG.   It is used inside the mouth to kill a bacteria known as Staphylococcus aureus.  Let your doctor know if you are allergic to Chlorhexidine.    Why do I need to use CHG oral/dental rinse?  The CHG oral/dental rinse helps to kill a bacteria in your mouth known as Staphylococcus aureus.     This reduces the risk of infection at the surgical site.      Using your CHG oral/dental rinse  STEPS:  Use your CHG oral/dental rinse after you brush your teeth the night before (at bedtime) and the morning of your surgery.  Follow all directions on your prescription label.    Use the cap on the container to measure 15ml   Swish (gargle if you can) the mouthwash in your mouth for at least 30 seconds, (do not swallow) and spit out  After you use your CHG rinse, do not rinse your mouth with water, drink or eat.  Please refer to the prescription label for the appropriate time to resume oral intake      What side effects might I have using the CHG oral/dental rinse?  CHG rinse will stick to plaque on the teeth.  Brush and floss just before use.  Teeth brushing will help avoid staining of plaque during use.      Patient Information: Home Preoperative Antibacterial Shower      What is a home preoperative antibacterial shower?  This shower is a way of cleaning the skin with a germ-killing solution before surgery.  The solution contains chlorhexidine, commonly known as CHG.  CHG is a skin cleanser with germ-killing ability.  Let your doctor know if you are allergic to chlorhexidine.    Why do I need to take a preoperative antibacterial shower?  Skin is not sterile.  It is best to try to make your skin as free of germs as possible before surgery.  Proper cleansing with a germ-killing soap before surgery can lower the number of germs on your skin.  This helps to reduce the risk of infection at the surgical site.  Following the instructions listed below will help you prepare your skin for surgery.       How do I use the solution?  Steps:  Begin using your CHG soap 5 days before your scheduled surgery on ________________________.    First, wash and rinse your hair using the CHG soap. Keep CHG soap away from ear canals and eyes.  Rinse completely, do not condition.  Hair extensions should be removed.  Wash your face with your normal soap and rinse.    Apply the CHG solution to a clean wet washcloth.  Turn the water off or move away from the water spray to avoid premature rinsing of the CHG soap as you are applying.   Firmly lather your entire body from the neck down.  Do not use on your face.  Pay special attention to the area(s) where your incision(s) will be located unless they are on your face.  Avoid scrubbing your skin too hard.  The important point is to have the CHG soap sit on your skin for 3 minutes.    When the 3 minutes are up, turn on the water and rinse the CHG solution off your body completely.   DO NOT wash with regular soap after you have used the CHG soap solution  Pat yourself dry with a clean, freshly-laundered towel.  DO NOT apply powders, deodorants, or lotions.  Dress in clean, freshly laundered nightclothes.    Be sure to sleep with clean, freshly laundered sheets.  Be aware that CHG will cause stains on fabrics; if you wash them with bleach after use.  Rinse your washcloth and other linens that have contact with CHG completely.  Use only non-chlorine detergents to launder the items used.   The morning of surgery is the fifth day.  Repeat the above steps and dress in clean comfortable clothing     Whom should I contact if I have any questions regarding the use of CHG soap?  Call the [facility] at [phone] if you have any questions.               Preoperative Brain Exercises    What are brain exercises?  A brain exercise is any activity that engages your thinking (cognitive) skills.    What types of activities are  considered brain exercises?  Jigsaw puzzles, crossword puzzles, word jumble, memory games, word search, and many more.  Many can be found free online or on your phone via a mobile lyla.    Why should I do brain exercises before my surgery?  More recent research has shown brain exercise before surgery can lower the risk of postoperative delirium (confusion) which can be especially important for older adults.  Patients who did brain exercises for 5 to 10 hours the days before surgery, cut their risk of postoperative delirium in half up to 1 week after surgery.         [facility]    Preoperative Deep Breathing Exercises    Why it is important to do deep breathing exercises before my surgery?  Deep breathing exercises strengthen your breathing muscles.  This helps you to recover after your surgery and decreases the chance of breathing complications.      How are the deep breathing exercises done?  Sit straight with your back supported.  Breathe in deeply and slowly through your nose. Your lower rib cage should expand and your abdomen may move forward.  Hold that breath for 3 to 5 seconds.  Breathe out through pursed lips, slowly and completely.  Rest and repeat 10 times every hour while awake.  Rest longer if you become dizzy or lightheaded.         Patient and Family Education             Ways You Can Help Prevent Blood Clots             This handout explains some simple things you can do to help prevent blood clots.      Blood clots are blockages that can form in the body's veins. When a blood clot forms in your deep veins, it may be called a deep vein thrombosis, or DVT for short. Blood clots can happen in any part of the body where blood flows, but they are most common in the arms and legs. If a piece of a blood clot breaks free and travels to the lungs, it is called a pulmonary embolus (PE). A PE can be a very serious problem.         Being in the hospital or having surgery can raise your  chances of getting a blood clot because you may not be well enough to move around as much as you normally do.         Ways you can help prevent blood clots in the hospital         Wearing SCDs. SCDs stands for Sequential Compression Devices.   SCDs are special sleeves that wrap around your legs  They attach to a pump that fills them with air to gently squeeze your legs every few minutes.   This helps return the blood in your legs to your heart.   SCDs should only be taken off when walking or bathing.   SCDs may not be comfortable, but they can help save your life.               Wearing compression stockings - if your doctor orders them. These special snug fitting stockings gently squeeze your legs to help blood flow.       Walking. Walking helps move the blood in your legs.   If your doctor says it is ok, try walking the halls at least   5 times a day. Ask us to help you get up, so you don't fall.      Taking any blood thinning medicines your doctor orders.        Page 1 of 2     [facility]; 3/23   Ways you can help prevent blood clots at home       Wearing compression stockings - if your doctor orders them. ? Walking - to help move the blood in your legs.       Taking any blood thinning medicines your doctor orders.      Signs of a blood clot or PE      Tell your doctor or nurse know right away if you have of the problems listed below.    If you are at home, seek medical care right away. Call 911 for chest pain or problems breathing.               Signs of a blood clot (DVT) - such as pain,  swelling, redness or warmth in your arm or leg      Signs of a pulmonary embolism (PE) - such as chest     pain or feeling short of breath

## 2025-01-09 NOTE — CPM/PAT H&P
"CPM/PAT Evaluation       Name: Aryan Woods (Aryan Woods \"Viral\")  /Age: 1980/44 y.o.     Visit Type:   In-Person       Chief Complaint: Cervical myelopathy    HPI  Patient is a 44-year-old male with a past medical history significant for cervical myelopathy.  Patient is being evaluated in [facility] in anticipation of a C4-C5 anterior cervical discectomy and fusion anterior approach with Dr. Garcia on 2025.  Past Medical History:   Diagnosis Date    Anxiety     Cervical myelopathy 2024    C4-5 disc bulge    Headache     Herpes zoster 2009    left forehead    Hyperglycemia     Hyperlipidemia     Hypertension     Vitamin D deficiency 2024       Past Surgical History:   Procedure Laterality Date    FIBULA FRACTURE SURGERY Left 2022    OTHER SURGICAL HISTORY      cyst removal on forehead       Patient  reports being sexually active and has had partner(s) who are female. He reports using the following method of birth control/protection: Condom Male.    No family history on file.    Allergies   Allergen Reactions    Bee Venom Protein (Honey Bee) Anaphylaxis     Patient states he gets swollen and has shortness of breath.       Prior to Admission medications    Medication Sig Start Date End Date Taking? Authorizing Provider   ergocalciferol (Vitamin D-2) 1.25 MG (97876 UT) capsule Take 1 capsule (50,000 Units) by mouth 1 (one) time per week.  Patient not taking: Reported on 24  Dell Cisneros MD   multivitamin tablet Take 1 tablet by mouth once daily.  Patient not taking: Reported on 24   Dell Cisneros MD   triamcinolone (Kenalog) 0.1 % cream Apply topically 2 times a day. Apply to affected area 1-2 times daily as needed.  Patient not taking: Reported on 2025 6/10/24   GAVIN Valles-CNP        PAT ROS:   Constitutional:   neg    Neuro/Psych:    numbness (LUE)   weakness (LUE)  Eyes:   neg    Ears:   neg    Nose:   neg  " "  Mouth:   neg    Throat:   neg    Neck:    Neck tingling radiates to BUE  Limited ROM   neck stiffness  Cardio:   neg    Respiratory:   neg    Endocrine:   neg    GI:   neg    :   neg    Musculoskeletal:   Hematologic:   neg    Skin:  neg        Physical Exam  Vitals reviewed.   Constitutional:       Appearance: Normal appearance.   HENT:      Head: Normocephalic and atraumatic.      Nose: Nose normal.      Mouth/Throat:      Mouth: Mucous membranes are moist.   Neck:      Comments: Neck tingling radiates to BUE  Limited ROM  Cardiovascular:      Rate and Rhythm: Normal rate and regular rhythm.      Pulses: Normal pulses.      Heart sounds: Normal heart sounds.   Pulmonary:      Effort: Pulmonary effort is normal.      Breath sounds: Normal breath sounds.   Abdominal:      Palpations: Abdomen is soft.   Musculoskeletal:         General: Normal range of motion.   Skin:     General: Skin is warm.   Neurological:      General: No focal deficit present.      Mental Status: He is alert and oriented to person, place, and time.      Motor: Weakness (LLE) present.      Gait: Gait abnormal (LLE).   Psychiatric:         Mood and Affect: Mood normal.         Behavior: Behavior normal.          PAT AIRWAY:   Airway:     Mallampati::  II    TM distance::  >3 FB    Neck ROM::  Full  normal        Testing/Diagnostic:     Patient Specialist/PCP:     Visit Vitals  Pulse 68   Temp 36.7 °C (98.1 °F)   Ht 1.753 m (5' 9\")   Wt 82.6 kg (182 lb)   BMI 26.88 kg/m²   Smoking Status Never   BSA 2.01 m²       DASI Risk Score      Flowsheet Row Pre-Admission Testing from 1/9/2025 in [facility] Questionnaire Series Submission from 12/18/2024 in [facility] with Generic Provider Christina   Can you take care of yourself (eat, dress, bathe, or use toilet)?  2.75 filed at 01/09/2025 0940 2.75  filed at 12/18/2024 1516   Can you walk indoors, such as around your house? 1.75 filed at 01/09/2025 0940 1.75  filed at 12/18/2024 1516 "   Can you walk a block or two on level ground?  2.75 filed at 01/09/2025 0940 2.75  filed at 12/18/2024 1516   Can you climb a flight of stairs or walk up a hill? 5.5 filed at 01/09/2025 0940 5.5  filed at 12/18/2024 1516   Can you run a short distance? 8 filed at 01/09/2025 0940 8  filed at 12/18/2024 1516   Can you do light work around the house like dusting or washing dishes? 2.7 filed at 01/09/2025 0940 2.7  filed at 12/18/2024 1516   Can you do moderate work around the house like vacuuming, sweeping floors or carrying groceries? 3.5 filed at 01/09/2025 0940 3.5  filed at 12/18/2024 1516   Can you do heavy work around the house like scrubbing floors or lifting and moving heavy furniture?  8 filed at 01/09/2025 0940 8  filed at 12/18/2024 1516   Can you do yard work like raking leaves, weeding or pushing a mower? 4.5 filed at 01/09/2025 0940 4.5  filed at 12/18/2024 1516   Can you have sexual relations? 5.25 filed at 01/09/2025 0940 5.25  filed at 12/18/2024 1516   Can you participate in moderate recreational activities like golf, bowling, dancing, doubles tennis or throwing a baseball or football? 6 filed at 01/09/2025 0940 0  filed at 12/18/2024 1516   Can you participate in strenous sports like swimming, singles tennis, football, basketball, or skiing? 7.5 filed at 01/09/2025 0940 0  filed at 12/18/2024 1516   DASI SCORE 58.2 filed at 01/09/2025 0940 44.7  filed at 12/18/2024 1516   METS Score (Will be calculated only when all the questions are answered) 9.9 filed at 01/09/2025 0940 8.2  filed at 12/18/2024 1516          Caprini DVT Assessment    No data to display       Modified Frailty Index    No data to display       CHADS2 Stroke Risk         N/A 3 to 100%: High Risk   2 to < 3%: Medium Risk   0 to < 2%: Low Risk     Last Change: N/A          This score determines the patient's risk of having a stroke if the patient has atrial fibrillation.        This score is not applicable to this patient. Components  are not calculated.          Revised Cardiac Risk Index    No data to display       Apfel Simplified Score    No data to display       Risk Analysis Index Results This Encounter    No data found in the last 10 encounters.       Stop Bang Score      Flowsheet Row Pre-Admission Testing from 1/9/2025 in [facility] Questionnaire Series Submission from 12/18/2024 in [facility] with Generic Provider Christina   Do you snore loudly? 0 filed at 01/09/2025 0940 0  filed at 12/18/2024 1516   Do you often feel tired or fatigued after your sleep? 0 filed at 01/09/2025 0940 1  filed at 12/18/2024 1516   Has anyone ever observed you stop breathing in your sleep? 0 filed at 01/09/2025 0940 0  filed at 12/18/2024 1516   Do you have or are you being treated for high blood pressure? 0 filed at 01/09/2025 0940 0  filed at 12/18/2024 1516   Recent BMI (Calculated) 26.9 filed at 01/09/2025 0940 26.6  filed at 12/18/2024 1516   Is BMI greater than 35 kg/m2? 0=No filed at 01/09/2025 0940 0=No  filed at 12/18/2024 1516   Age older than 50 years old? 0=No filed at 01/09/2025 0940 0=No  filed at 12/18/2024 1516   Is your neck circumference greater than 17 inches (Male) or 16 inches (Female)? 0 filed at 01/09/2025 0940 --   Gender - Male 1=Yes filed at 01/09/2025 0940 1=Yes  filed at 12/18/2024 1516   STOP-BANG Total Score 1 filed at 01/09/2025 0940 --          Prodigy: High Risk  Total Score: 8              Prodigy Gender Score          ARISCAT Score for Postoperative Pulmonary Complications    No data to display       Nguyen Perioperative Risk for Myocardial Infarction or Cardiac Arrest (GERTA)    No data to display         Assessment and Plan:     Anesthesia  The patient denies problems with anesthesia in the past such as PONV, prolonged sedation, awareness, dental damage, aspiration, cardiac arrest, difficult intubation, or unexpected hospital admissions.     Neurology  The patient has cervical myelopathy. The patient is at  increased risk for perioperative stroke secondary to hypertension , hyperlipidemia, general anesthesia, operative time >2.5 hours.    HEENT/Airway  No diagnoses, significant findings on chart review, clinical presentation, or evaluation. No documented or reported history of airway difficulty.     Cardiovascular  The patient is scheduled for non-cardiac surgery associated with elevated risk. The patient has no major cardiac contraindications to non- cardiac surgery.  RCRI  The patient meets 0 RCRI criteria and therefor has a 3.9% risk of major adverse cardiac complications.  METS  The patient's functional capacity capacity is greater than 4 METS.  EKG  The patient has no EKG or echocardiographic changes concerning for myocardial ischemia.   Heart Failure  The patient has no known history of heart failure.  Additionally, the patient reports no symptoms of heart failure and demonstrates no signs of heart failure.  Hypertension Evaluation  The patient was hypertensive and has been hypertensive in the past, pt is not on medication.  Per pt hypertension is most consistent with white coat syndrome.  Heart Rhythm Evaluation  The patient has no history of arrhythmias.  Heart Valve Evaluation  The patient has no known history of valvular heart disease. The patient has no symptoms or physical exam findings to suggest valvular heart disease.  Cardiology Evaluation  The patient is not followed by cardiology.    The patient has a 30-day risk for MACE of 0 predictors, 3.9% risk for cardiac death, nonfatal myocardial infarction, and nonfatal cardiac arrest.  GRETA score which indicates a 0.1% risk of intraoperative or 30-day postoperative MACE (major adverse cardiac event).    Pulmonary  No significant findings on chart review or clinical presentation and evaluation. The patient is at increased risk of perioperative pulmonary complications secondary to neurosurgery, neck surgery.    The patient has a stop bang score of 1, which  places patient at low risk for having COLLIN.    ARISCAT 23, low, 1.6% risk of in-hospital postoperative pulmonary complications  PRODIGY 8, intermediate risk of respiratory depression episode. Patient given PI sheet for preoperative deep breathing exercises.    Hematology  No diagnoses or significant findings on chart review or clinical presentation and evaluation.  Antiplatelet management   The patient is not currently receiving antiplatelet therapy.  Anticoagulation management  The patient is not currently receiving anticoagulation therapy.    Caprini score 5, high risk of perioperative VTE.     Patient instructed to ambulate as soon as possible postoperatively to decrease thromboembolic risk. Initiate mechanical DVT prophylaxis as soon as possible and initiate chemical prophylaxis when deemed safe from a bleeding standpoint post surgery.     Transfusion Evaluation  A type and screen was obtained given the likelihood for perioperative transfusion of blood or blood products.    Gastrointestinal  No diagnoses or significant findings on chart review or clinical presentation and evaluation.    Eat 10- 0,  self-perceived oropharyngeal dysphagia scale (0-40)     Genitourinary  No diagnoses or significant findings on chart review or clinical presentation and evaluation.    Renal  No renal diagnoses or significant findings on chart review or clinical presentation and evaluation. The patient has specific risk factors associated with increased risk of perioperative renal complications related to male gender, hypertension.    Musculoskeletal  The patient has cervical stenosis    Endocrine  Diabetes Evaluation  The patient has no history of diabetes mellitus.  Thyroid Disease Evaluation  The patient has no history of thyroid disease.    ID  No diagnoses or significant findings on chart review or clinical presentation and evaluation. MRSA screening obtained. Prescriptions and instructions given for Hibiclens and  Peridex.    -Preoperative medication instructions were provided and reviewed with the patient.  Any additional testing or evaluation was explained to the patient.  NPO Instructions were discussed, and the patient's questions were answered prior to conclusion of this encounter. Patient verbalized understanding of preoperative instructions. After Visit Summary given.      Recent Results (from the past week)   ECG 12 Lead    Collection Time: 01/09/25  9:15 AM   Result Value Ref Range    Ventricular Rate 78 BPM    Atrial Rate 78 BPM    KS Interval 140 ms    QRS Duration 98 ms    QT Interval 390 ms    QTC Calculation(Bazett) 444 ms    P Axis 40 degrees    R Axis 41 degrees    T Axis 30 degrees    QRS Count 12 beats    Q Onset 215 ms    P Onset 145 ms    P Offset 194 ms    T Offset 410 ms    QTC Fredericia 425 ms   Staphylococcus aureus/MRSA colonization, Culture    Collection Time: 01/09/25 10:15 AM    Specimen: Nares/Axilla/Groin; Swab   Result Value Ref Range    Staph/MRSA Screen Culture (A)      Isolated: Methicillin Susceptible Staphylococcus aureus (MSSA)   CBC    Collection Time: 01/09/25 10:15 AM   Result Value Ref Range    WBC 5.1 4.4 - 11.3 x10*3/uL    nRBC 0.0 0.0 - 0.0 /100 WBCs    RBC 5.33 4.50 - 5.90 x10*6/uL    Hemoglobin 15.2 13.5 - 17.5 g/dL    Hematocrit 46.0 41.0 - 52.0 %    MCV 86 80 - 100 fL    MCH 28.5 26.0 - 34.0 pg    MCHC 33.0 32.0 - 36.0 g/dL    RDW 12.2 11.5 - 14.5 %    Platelets 281 150 - 450 x10*3/uL   Basic Metabolic Panel    Collection Time: 01/09/25 10:15 AM   Result Value Ref Range    Glucose 104 (H) 74 - 99 mg/dL    Sodium 139 136 - 145 mmol/L    Potassium 4.2 3.5 - 5.3 mmol/L    Chloride 102 98 - 107 mmol/L    Bicarbonate 29 21 - 32 mmol/L    Anion Gap 12 10 - 20 mmol/L    Urea Nitrogen 14 6 - 23 mg/dL    Creatinine 0.94 0.50 - 1.30 mg/dL    eGFR >90 >60 mL/min/1.73m*2    Calcium 10.1 8.6 - 10.6 mg/dL   Type And Screen    Collection Time: 01/09/25 10:15 AM   Result Value Ref Range    ABO  TYPE O     Rh TYPE POS     ANTIBODY SCREEN NEG

## 2025-01-09 NOTE — H&P (VIEW-ONLY)
"CPM/PAT Evaluation       Name: Aryan Woods (Aryan Woods \"Viral\")  /Age: 1980/44 y.o.     Visit Type:   In-Person       Chief Complaint: Cervical myelopathy    HPI  Patient is a 44-year-old male with a past medical history significant for cervical myelopathy.  Patient is being evaluated in [facility] in anticipation of a C4-C5 anterior cervical discectomy and fusion anterior approach with Dr. Garcia on 2025.  Past Medical History:   Diagnosis Date    Anxiety     Cervical myelopathy 2024    C4-5 disc bulge    Headache     Herpes zoster 2009    left forehead    Hyperglycemia     Hyperlipidemia     Hypertension     Vitamin D deficiency 2024       Past Surgical History:   Procedure Laterality Date    FIBULA FRACTURE SURGERY Left 2022    OTHER SURGICAL HISTORY      cyst removal on forehead       Patient  reports being sexually active and has had partner(s) who are female. He reports using the following method of birth control/protection: Condom Male.    No family history on file.    Allergies   Allergen Reactions    Bee Venom Protein (Honey Bee) Anaphylaxis     Patient states he gets swollen and has shortness of breath.       Prior to Admission medications    Medication Sig Start Date End Date Taking? Authorizing Provider   ergocalciferol (Vitamin D-2) 1.25 MG (76081 UT) capsule Take 1 capsule (50,000 Units) by mouth 1 (one) time per week.  Patient not taking: Reported on 24  Dell Cisneros MD   multivitamin tablet Take 1 tablet by mouth once daily.  Patient not taking: Reported on 24   Dell Cisneros MD   triamcinolone (Kenalog) 0.1 % cream Apply topically 2 times a day. Apply to affected area 1-2 times daily as needed.  Patient not taking: Reported on 2025 6/10/24   GAVIN Valles-CNP        PAT ROS:   Constitutional:   neg    Neuro/Psych:    numbness (LUE)   weakness (LUE)  Eyes:   neg    Ears:   neg    Nose:   neg  " "  Mouth:   neg    Throat:   neg    Neck:    Neck tingling radiates to BUE  Limited ROM   neck stiffness  Cardio:   neg    Respiratory:   neg    Endocrine:   neg    GI:   neg    :   neg    Musculoskeletal:   Hematologic:   neg    Skin:  neg        Physical Exam  Vitals reviewed.   Constitutional:       Appearance: Normal appearance.   HENT:      Head: Normocephalic and atraumatic.      Nose: Nose normal.      Mouth/Throat:      Mouth: Mucous membranes are moist.   Neck:      Comments: Neck tingling radiates to BUE  Limited ROM  Cardiovascular:      Rate and Rhythm: Normal rate and regular rhythm.      Pulses: Normal pulses.      Heart sounds: Normal heart sounds.   Pulmonary:      Effort: Pulmonary effort is normal.      Breath sounds: Normal breath sounds.   Abdominal:      Palpations: Abdomen is soft.   Musculoskeletal:         General: Normal range of motion.   Skin:     General: Skin is warm.   Neurological:      General: No focal deficit present.      Mental Status: He is alert and oriented to person, place, and time.      Motor: Weakness (LLE) present.      Gait: Gait abnormal (LLE).   Psychiatric:         Mood and Affect: Mood normal.         Behavior: Behavior normal.          PAT AIRWAY:   Airway:     Mallampati::  II    TM distance::  >3 FB    Neck ROM::  Full  normal        Testing/Diagnostic:     Patient Specialist/PCP:     Visit Vitals  Pulse 68   Temp 36.7 °C (98.1 °F)   Ht 1.753 m (5' 9\")   Wt 82.6 kg (182 lb)   BMI 26.88 kg/m²   Smoking Status Never   BSA 2.01 m²       DASI Risk Score      Flowsheet Row Pre-Admission Testing from 1/9/2025 in [facility] Questionnaire Series Submission from 12/18/2024 in [facility] with Generic Provider Christina   Can you take care of yourself (eat, dress, bathe, or use toilet)?  2.75 filed at 01/09/2025 0940 2.75  filed at 12/18/2024 1516   Can you walk indoors, such as around your house? 1.75 filed at 01/09/2025 0940 1.75  filed at 12/18/2024 1516 "   Can you walk a block or two on level ground?  2.75 filed at 01/09/2025 0940 2.75  filed at 12/18/2024 1516   Can you climb a flight of stairs or walk up a hill? 5.5 filed at 01/09/2025 0940 5.5  filed at 12/18/2024 1516   Can you run a short distance? 8 filed at 01/09/2025 0940 8  filed at 12/18/2024 1516   Can you do light work around the house like dusting or washing dishes? 2.7 filed at 01/09/2025 0940 2.7  filed at 12/18/2024 1516   Can you do moderate work around the house like vacuuming, sweeping floors or carrying groceries? 3.5 filed at 01/09/2025 0940 3.5  filed at 12/18/2024 1516   Can you do heavy work around the house like scrubbing floors or lifting and moving heavy furniture?  8 filed at 01/09/2025 0940 8  filed at 12/18/2024 1516   Can you do yard work like raking leaves, weeding or pushing a mower? 4.5 filed at 01/09/2025 0940 4.5  filed at 12/18/2024 1516   Can you have sexual relations? 5.25 filed at 01/09/2025 0940 5.25  filed at 12/18/2024 1516   Can you participate in moderate recreational activities like golf, bowling, dancing, doubles tennis or throwing a baseball or football? 6 filed at 01/09/2025 0940 0  filed at 12/18/2024 1516   Can you participate in strenous sports like swimming, singles tennis, football, basketball, or skiing? 7.5 filed at 01/09/2025 0940 0  filed at 12/18/2024 1516   DASI SCORE 58.2 filed at 01/09/2025 0940 44.7  filed at 12/18/2024 1516   METS Score (Will be calculated only when all the questions are answered) 9.9 filed at 01/09/2025 0940 8.2  filed at 12/18/2024 1516          Caprini DVT Assessment    No data to display       Modified Frailty Index    No data to display       CHADS2 Stroke Risk         N/A 3 to 100%: High Risk   2 to < 3%: Medium Risk   0 to < 2%: Low Risk     Last Change: N/A          This score determines the patient's risk of having a stroke if the patient has atrial fibrillation.        This score is not applicable to this patient. Components  are not calculated.          Revised Cardiac Risk Index    No data to display       Apfel Simplified Score    No data to display       Risk Analysis Index Results This Encounter    No data found in the last 10 encounters.       Stop Bang Score      Flowsheet Row Pre-Admission Testing from 1/9/2025 in [facility] Questionnaire Series Submission from 12/18/2024 in [facility] with Generic Provider Christina   Do you snore loudly? 0 filed at 01/09/2025 0940 0  filed at 12/18/2024 1516   Do you often feel tired or fatigued after your sleep? 0 filed at 01/09/2025 0940 1  filed at 12/18/2024 1516   Has anyone ever observed you stop breathing in your sleep? 0 filed at 01/09/2025 0940 0  filed at 12/18/2024 1516   Do you have or are you being treated for high blood pressure? 0 filed at 01/09/2025 0940 0  filed at 12/18/2024 1516   Recent BMI (Calculated) 26.9 filed at 01/09/2025 0940 26.6  filed at 12/18/2024 1516   Is BMI greater than 35 kg/m2? 0=No filed at 01/09/2025 0940 0=No  filed at 12/18/2024 1516   Age older than 50 years old? 0=No filed at 01/09/2025 0940 0=No  filed at 12/18/2024 1516   Is your neck circumference greater than 17 inches (Male) or 16 inches (Female)? 0 filed at 01/09/2025 0940 --   Gender - Male 1=Yes filed at 01/09/2025 0940 1=Yes  filed at 12/18/2024 1516   STOP-BANG Total Score 1 filed at 01/09/2025 0940 --          Prodigy: High Risk  Total Score: 8              Prodigy Gender Score          ARISCAT Score for Postoperative Pulmonary Complications    No data to display       Nguyen Perioperative Risk for Myocardial Infarction or Cardiac Arrest (GRETA)    No data to display         Assessment and Plan:     Anesthesia  The patient denies problems with anesthesia in the past such as PONV, prolonged sedation, awareness, dental damage, aspiration, cardiac arrest, difficult intubation, or unexpected hospital admissions.     Neurology  The patient has cervical myelopathy. The patient is at  increased risk for perioperative stroke secondary to hypertension , hyperlipidemia, general anesthesia, operative time >2.5 hours.    HEENT/Airway  No diagnoses, significant findings on chart review, clinical presentation, or evaluation. No documented or reported history of airway difficulty.     Cardiovascular  The patient is scheduled for non-cardiac surgery associated with elevated risk. The patient has no major cardiac contraindications to non- cardiac surgery.  RCRI  The patient meets 0 RCRI criteria and therefor has a 3.9% risk of major adverse cardiac complications.  METS  The patient's functional capacity capacity is greater than 4 METS.  EKG  The patient has no EKG or echocardiographic changes concerning for myocardial ischemia.   Heart Failure  The patient has no known history of heart failure.  Additionally, the patient reports no symptoms of heart failure and demonstrates no signs of heart failure.  Hypertension Evaluation  The patient was hypertensive and has been hypertensive in the past, pt is not on medication.  Per pt hypertension is most consistent with white coat syndrome.  Heart Rhythm Evaluation  The patient has no history of arrhythmias.  Heart Valve Evaluation  The patient has no known history of valvular heart disease. The patient has no symptoms or physical exam findings to suggest valvular heart disease.  Cardiology Evaluation  The patient is not followed by cardiology.    The patient has a 30-day risk for MACE of 0 predictors, 3.9% risk for cardiac death, nonfatal myocardial infarction, and nonfatal cardiac arrest.  GRETA score which indicates a 0.1% risk of intraoperative or 30-day postoperative MACE (major adverse cardiac event).    Pulmonary  No significant findings on chart review or clinical presentation and evaluation. The patient is at increased risk of perioperative pulmonary complications secondary to neurosurgery, neck surgery.    The patient has a stop bang score of 1, which  places patient at low risk for having COLLIN.    ARISCAT 23, low, 1.6% risk of in-hospital postoperative pulmonary complications  PRODIGY 8, intermediate risk of respiratory depression episode. Patient given PI sheet for preoperative deep breathing exercises.    Hematology  No diagnoses or significant findings on chart review or clinical presentation and evaluation.  Antiplatelet management   The patient is not currently receiving antiplatelet therapy.  Anticoagulation management  The patient is not currently receiving anticoagulation therapy.    Caprini score 5, high risk of perioperative VTE.     Patient instructed to ambulate as soon as possible postoperatively to decrease thromboembolic risk. Initiate mechanical DVT prophylaxis as soon as possible and initiate chemical prophylaxis when deemed safe from a bleeding standpoint post surgery.     Transfusion Evaluation  A type and screen was obtained given the likelihood for perioperative transfusion of blood or blood products.    Gastrointestinal  No diagnoses or significant findings on chart review or clinical presentation and evaluation.    Eat 10- 0,  self-perceived oropharyngeal dysphagia scale (0-40)     Genitourinary  No diagnoses or significant findings on chart review or clinical presentation and evaluation.    Renal  No renal diagnoses or significant findings on chart review or clinical presentation and evaluation. The patient has specific risk factors associated with increased risk of perioperative renal complications related to male gender, hypertension.    Musculoskeletal  The patient has cervical stenosis    Endocrine  Diabetes Evaluation  The patient has no history of diabetes mellitus.  Thyroid Disease Evaluation  The patient has no history of thyroid disease.    ID  No diagnoses or significant findings on chart review or clinical presentation and evaluation. MRSA screening obtained. Prescriptions and instructions given for Hibiclens and  Peridex.    -Preoperative medication instructions were provided and reviewed with the patient.  Any additional testing or evaluation was explained to the patient.  NPO Instructions were discussed, and the patient's questions were answered prior to conclusion of this encounter. Patient verbalized understanding of preoperative instructions. After Visit Summary given.      Recent Results (from the past week)   ECG 12 Lead    Collection Time: 01/09/25  9:15 AM   Result Value Ref Range    Ventricular Rate 78 BPM    Atrial Rate 78 BPM    DE Interval 140 ms    QRS Duration 98 ms    QT Interval 390 ms    QTC Calculation(Bazett) 444 ms    P Axis 40 degrees    R Axis 41 degrees    T Axis 30 degrees    QRS Count 12 beats    Q Onset 215 ms    P Onset 145 ms    P Offset 194 ms    T Offset 410 ms    QTC Fredericia 425 ms   Staphylococcus aureus/MRSA colonization, Culture    Collection Time: 01/09/25 10:15 AM    Specimen: Nares/Axilla/Groin; Swab   Result Value Ref Range    Staph/MRSA Screen Culture (A)      Isolated: Methicillin Susceptible Staphylococcus aureus (MSSA)   CBC    Collection Time: 01/09/25 10:15 AM   Result Value Ref Range    WBC 5.1 4.4 - 11.3 x10*3/uL    nRBC 0.0 0.0 - 0.0 /100 WBCs    RBC 5.33 4.50 - 5.90 x10*6/uL    Hemoglobin 15.2 13.5 - 17.5 g/dL    Hematocrit 46.0 41.0 - 52.0 %    MCV 86 80 - 100 fL    MCH 28.5 26.0 - 34.0 pg    MCHC 33.0 32.0 - 36.0 g/dL    RDW 12.2 11.5 - 14.5 %    Platelets 281 150 - 450 x10*3/uL   Basic Metabolic Panel    Collection Time: 01/09/25 10:15 AM   Result Value Ref Range    Glucose 104 (H) 74 - 99 mg/dL    Sodium 139 136 - 145 mmol/L    Potassium 4.2 3.5 - 5.3 mmol/L    Chloride 102 98 - 107 mmol/L    Bicarbonate 29 21 - 32 mmol/L    Anion Gap 12 10 - 20 mmol/L    Urea Nitrogen 14 6 - 23 mg/dL    Creatinine 0.94 0.50 - 1.30 mg/dL    eGFR >90 >60 mL/min/1.73m*2    Calcium 10.1 8.6 - 10.6 mg/dL   Type And Screen    Collection Time: 01/09/25 10:15 AM   Result Value Ref Range    ABO  TYPE O     Rh TYPE POS     ANTIBODY SCREEN NEG

## 2025-01-11 LAB
ATRIAL RATE: 78 BPM
P AXIS: 40 DEGREES
P OFFSET: 194 MS
P ONSET: 145 MS
PR INTERVAL: 140 MS
Q ONSET: 215 MS
QRS COUNT: 12 BEATS
QRS DURATION: 98 MS
QT INTERVAL: 390 MS
QTC CALCULATION(BAZETT): 444 MS
QTC FREDERICIA: 425 MS
R AXIS: 41 DEGREES
STAPHYLOCOCCUS SPEC CULT: ABNORMAL
T AXIS: 30 DEGREES
T OFFSET: 410 MS
VENTRICULAR RATE: 78 BPM

## 2025-01-13 ENCOUNTER — ANESTHESIA EVENT (OUTPATIENT)
Dept: OPERATING ROOM | Facility: HOSPITAL | Age: 45
End: 2025-01-13
Payer: COMMERCIAL

## 2025-01-14 ENCOUNTER — APPOINTMENT (OUTPATIENT)
Dept: NEUROLOGY | Facility: HOSPITAL | Age: 45
End: 2025-01-14
Payer: COMMERCIAL

## 2025-01-14 ENCOUNTER — HOSPITAL ENCOUNTER (OUTPATIENT)
Facility: HOSPITAL | Age: 45
LOS: 1 days | Discharge: HOME | End: 2025-01-15
Attending: NEUROLOGICAL SURGERY | Admitting: NEUROLOGICAL SURGERY
Payer: COMMERCIAL

## 2025-01-14 ENCOUNTER — APPOINTMENT (OUTPATIENT)
Dept: RADIOLOGY | Facility: HOSPITAL | Age: 45
End: 2025-01-14
Payer: COMMERCIAL

## 2025-01-14 ENCOUNTER — ANESTHESIA (OUTPATIENT)
Dept: OPERATING ROOM | Facility: HOSPITAL | Age: 45
End: 2025-01-14
Payer: COMMERCIAL

## 2025-01-14 DIAGNOSIS — K59.03 CONSTIPATION DUE TO PAIN MEDICATION: ICD-10-CM

## 2025-01-14 DIAGNOSIS — R11.2 NAUSEA AND VOMITING, UNSPECIFIED VOMITING TYPE: ICD-10-CM

## 2025-01-14 DIAGNOSIS — G89.18 POST-OP PAIN: ICD-10-CM

## 2025-01-14 DIAGNOSIS — G95.9 CERVICAL MYELOPATHY: Primary | ICD-10-CM

## 2025-01-14 LAB
ABO GROUP (TYPE) IN BLOOD: NORMAL
RH FACTOR (ANTIGEN D): NORMAL

## 2025-01-14 PROCEDURE — 3700000001 HC GENERAL ANESTHESIA TIME - INITIAL BASE CHARGE: Performed by: NEUROLOGICAL SURGERY

## 2025-01-14 PROCEDURE — 7100000002 HC RECOVERY ROOM TIME - EACH INCREMENTAL 1 MINUTE: Performed by: NEUROLOGICAL SURGERY

## 2025-01-14 PROCEDURE — 2720000007 HC OR 272 NO HCPCS: Performed by: NEUROLOGICAL SURGERY

## 2025-01-14 PROCEDURE — 3700000002 HC GENERAL ANESTHESIA TIME - EACH INCREMENTAL 1 MINUTE: Performed by: NEUROLOGICAL SURGERY

## 2025-01-14 PROCEDURE — 3600000018 HC OR TIME - INITIAL BASE CHARGE - PROCEDURE LEVEL SIX: Performed by: NEUROLOGICAL SURGERY

## 2025-01-14 PROCEDURE — 2500000004 HC RX 250 GENERAL PHARMACY W/ HCPCS (ALT 636 FOR OP/ED): Performed by: NEUROLOGICAL SURGERY

## 2025-01-14 PROCEDURE — 2500000001 HC RX 250 WO HCPCS SELF ADMINISTERED DRUGS (ALT 637 FOR MEDICARE OP): Performed by: STUDENT IN AN ORGANIZED HEALTH CARE EDUCATION/TRAINING PROGRAM

## 2025-01-14 PROCEDURE — 3600000017 HC OR TIME - EACH INCREMENTAL 1 MINUTE - PROCEDURE LEVEL SIX: Performed by: NEUROLOGICAL SURGERY

## 2025-01-14 PROCEDURE — 2780000003 HC OR 278 NO HCPCS: Performed by: NEUROLOGICAL SURGERY

## 2025-01-14 PROCEDURE — A22551 PR ARTHRODESIS ANT INTERBODY INC DISCECTOMY, CERVICAL BELOW C2

## 2025-01-14 PROCEDURE — 36415 COLL VENOUS BLD VENIPUNCTURE: CPT | Performed by: STUDENT IN AN ORGANIZED HEALTH CARE EDUCATION/TRAINING PROGRAM

## 2025-01-14 PROCEDURE — 2500000005 HC RX 250 GENERAL PHARMACY W/O HCPCS: Performed by: NEUROLOGICAL SURGERY

## 2025-01-14 PROCEDURE — 20931 SP BONE ALGRFT STRUCT ADD-ON: CPT | Performed by: NEUROLOGICAL SURGERY

## 2025-01-14 PROCEDURE — 22551 ARTHRD ANT NTRBDY CERVICAL: CPT | Performed by: NEUROLOGICAL SURGERY

## 2025-01-14 PROCEDURE — 95938 SOMATOSENSORY TESTING: CPT | Performed by: STUDENT IN AN ORGANIZED HEALTH CARE EDUCATION/TRAINING PROGRAM

## 2025-01-14 PROCEDURE — C1713 ANCHOR/SCREW BN/BN,TIS/BN: HCPCS | Performed by: NEUROLOGICAL SURGERY

## 2025-01-14 PROCEDURE — 95955 EEG DURING SURGERY: CPT | Performed by: STUDENT IN AN ORGANIZED HEALTH CARE EDUCATION/TRAINING PROGRAM

## 2025-01-14 PROCEDURE — 95938 SOMATOSENSORY TESTING: CPT | Mod: 59

## 2025-01-14 PROCEDURE — 1200000002 HC GENERAL ROOM WITH TELEMETRY DAILY

## 2025-01-14 PROCEDURE — 2500000004 HC RX 250 GENERAL PHARMACY W/ HCPCS (ALT 636 FOR OP/ED)

## 2025-01-14 PROCEDURE — 95941 IONM REMOTE/>1 PT OR PER HR: CPT | Performed by: STUDENT IN AN ORGANIZED HEALTH CARE EDUCATION/TRAINING PROGRAM

## 2025-01-14 PROCEDURE — A22551 PR ARTHRODESIS ANT INTERBODY INC DISCECTOMY, CERVICAL BELOW C2: Performed by: STUDENT IN AN ORGANIZED HEALTH CARE EDUCATION/TRAINING PROGRAM

## 2025-01-14 PROCEDURE — 22845 INSERT SPINE FIXATION DEVICE: CPT | Performed by: NEUROLOGICAL SURGERY

## 2025-01-14 PROCEDURE — C9359 IMPLNT,BON VOID FILLER-PUTTY: HCPCS | Performed by: NEUROLOGICAL SURGERY

## 2025-01-14 PROCEDURE — 7100000001 HC RECOVERY ROOM TIME - INITIAL BASE CHARGE: Performed by: NEUROLOGICAL SURGERY

## 2025-01-14 DEVICE — IMPLANTABLE DEVICE: Type: IMPLANTABLE DEVICE | Site: NECK | Status: FUNCTIONAL

## 2025-01-14 DEVICE — PIN, DISTRACTION, CERVICAL, 16 MM, STAINLESS STEEL, DISPOSABLE, STERILE: Type: IMPLANTABLE DEVICE | Site: SPINE CERVICAL | Status: NON-FUNCTIONAL

## 2025-01-14 DEVICE — ALLOGRAFT, TRIAD LORDOTIC 7 X 11 X 14: Type: IMPLANTABLE DEVICE | Site: NECK | Status: FUNCTIONAL

## 2025-01-14 RX ORDER — CYCLOBENZAPRINE HCL 10 MG
10 TABLET ORAL 3 TIMES DAILY
Status: DISCONTINUED | OUTPATIENT
Start: 2025-01-14 | End: 2025-01-15 | Stop reason: HOSPADM

## 2025-01-14 RX ORDER — LIDOCAINE HYDROCHLORIDE 20 MG/ML
INJECTION, SOLUTION INFILTRATION; PERINEURAL AS NEEDED
Status: DISCONTINUED | OUTPATIENT
Start: 2025-01-14 | End: 2025-01-14

## 2025-01-14 RX ORDER — FENTANYL CITRATE 50 UG/ML
INJECTION, SOLUTION INTRAMUSCULAR; INTRAVENOUS AS NEEDED
Status: DISCONTINUED | OUTPATIENT
Start: 2025-01-14 | End: 2025-01-14

## 2025-01-14 RX ORDER — DEXTROSE 50 % IN WATER (D50W) INTRAVENOUS SYRINGE
12.5
Status: DISCONTINUED | OUTPATIENT
Start: 2025-01-14 | End: 2025-01-15 | Stop reason: HOSPADM

## 2025-01-14 RX ORDER — ROCURONIUM BROMIDE 10 MG/ML
INJECTION, SOLUTION INTRAVENOUS AS NEEDED
Status: DISCONTINUED | OUTPATIENT
Start: 2025-01-14 | End: 2025-01-14

## 2025-01-14 RX ORDER — HYDROMORPHONE HYDROCHLORIDE 0.2 MG/ML
0.2 INJECTION INTRAMUSCULAR; INTRAVENOUS; SUBCUTANEOUS EVERY 5 MIN PRN
Status: DISCONTINUED | OUTPATIENT
Start: 2025-01-14 | End: 2025-01-14 | Stop reason: HOSPADM

## 2025-01-14 RX ORDER — HEPARIN SODIUM 5000 [USP'U]/ML
5000 INJECTION, SOLUTION INTRAVENOUS; SUBCUTANEOUS EVERY 8 HOURS
Status: DISCONTINUED | OUTPATIENT
Start: 2025-01-15 | End: 2025-01-15 | Stop reason: HOSPADM

## 2025-01-14 RX ORDER — MIDAZOLAM HYDROCHLORIDE 1 MG/ML
INJECTION INTRAMUSCULAR; INTRAVENOUS AS NEEDED
Status: DISCONTINUED | OUTPATIENT
Start: 2025-01-14 | End: 2025-01-14

## 2025-01-14 RX ORDER — NALOXONE HYDROCHLORIDE 0.4 MG/ML
0.2 INJECTION, SOLUTION INTRAMUSCULAR; INTRAVENOUS; SUBCUTANEOUS EVERY 5 MIN PRN
Status: DISCONTINUED | OUTPATIENT
Start: 2025-01-14 | End: 2025-01-15 | Stop reason: HOSPADM

## 2025-01-14 RX ORDER — HYDROMORPHONE HYDROCHLORIDE 1 MG/ML
INJECTION, SOLUTION INTRAMUSCULAR; INTRAVENOUS; SUBCUTANEOUS AS NEEDED
Status: DISCONTINUED | OUTPATIENT
Start: 2025-01-14 | End: 2025-01-14

## 2025-01-14 RX ORDER — OXYCODONE HYDROCHLORIDE 5 MG/1
5 TABLET ORAL EVERY 4 HOURS PRN
Status: DISCONTINUED | OUTPATIENT
Start: 2025-01-14 | End: 2025-01-14 | Stop reason: HOSPADM

## 2025-01-14 RX ORDER — OXYCODONE HYDROCHLORIDE 5 MG/1
2.5 TABLET ORAL EVERY 4 HOURS PRN
Status: DISCONTINUED | OUTPATIENT
Start: 2025-01-14 | End: 2025-01-15 | Stop reason: HOSPADM

## 2025-01-14 RX ORDER — ONDANSETRON HYDROCHLORIDE 2 MG/ML
INJECTION, SOLUTION INTRAVENOUS AS NEEDED
Status: DISCONTINUED | OUTPATIENT
Start: 2025-01-14 | End: 2025-01-14

## 2025-01-14 RX ORDER — CEFAZOLIN SODIUM 2 G/100ML
2 INJECTION, SOLUTION INTRAVENOUS ONCE
Status: DISCONTINUED | OUTPATIENT
Start: 2025-01-14 | End: 2025-01-14 | Stop reason: HOSPADM

## 2025-01-14 RX ORDER — ACETAMINOPHEN 325 MG/1
650 TABLET ORAL EVERY 4 HOURS PRN
Status: DISCONTINUED | OUTPATIENT
Start: 2025-01-14 | End: 2025-01-14 | Stop reason: HOSPADM

## 2025-01-14 RX ORDER — OXYCODONE HYDROCHLORIDE 5 MG/1
10 TABLET ORAL EVERY 4 HOURS PRN
Status: DISCONTINUED | OUTPATIENT
Start: 2025-01-14 | End: 2025-01-15 | Stop reason: HOSPADM

## 2025-01-14 RX ORDER — ONDANSETRON 4 MG/1
4 TABLET, FILM COATED ORAL EVERY 8 HOURS PRN
Status: DISCONTINUED | OUTPATIENT
Start: 2025-01-14 | End: 2025-01-15 | Stop reason: HOSPADM

## 2025-01-14 RX ORDER — OXYCODONE HYDROCHLORIDE 5 MG/1
10 TABLET ORAL EVERY 4 HOURS PRN
Status: DISCONTINUED | OUTPATIENT
Start: 2025-01-14 | End: 2025-01-14 | Stop reason: HOSPADM

## 2025-01-14 RX ORDER — PROPOFOL 10 MG/ML
INJECTION, EMULSION INTRAVENOUS AS NEEDED
Status: DISCONTINUED | OUTPATIENT
Start: 2025-01-14 | End: 2025-01-14

## 2025-01-14 RX ORDER — CEFAZOLIN 1 G/1
INJECTION, POWDER, FOR SOLUTION INTRAVENOUS AS NEEDED
Status: DISCONTINUED | OUTPATIENT
Start: 2025-01-14 | End: 2025-01-14

## 2025-01-14 RX ORDER — ONDANSETRON HYDROCHLORIDE 2 MG/ML
4 INJECTION, SOLUTION INTRAVENOUS EVERY 8 HOURS PRN
Status: DISCONTINUED | OUTPATIENT
Start: 2025-01-14 | End: 2025-01-15 | Stop reason: HOSPADM

## 2025-01-14 RX ORDER — ALBUTEROL SULFATE 0.83 MG/ML
2.5 SOLUTION RESPIRATORY (INHALATION) ONCE AS NEEDED
Status: DISCONTINUED | OUTPATIENT
Start: 2025-01-14 | End: 2025-01-14 | Stop reason: HOSPADM

## 2025-01-14 RX ORDER — AMOXICILLIN 250 MG
2 CAPSULE ORAL 2 TIMES DAILY
Status: DISCONTINUED | OUTPATIENT
Start: 2025-01-14 | End: 2025-01-15 | Stop reason: HOSPADM

## 2025-01-14 RX ORDER — DEXTROSE 50 % IN WATER (D50W) INTRAVENOUS SYRINGE
25
Status: DISCONTINUED | OUTPATIENT
Start: 2025-01-14 | End: 2025-01-15 | Stop reason: HOSPADM

## 2025-01-14 RX ORDER — ACETAMINOPHEN 325 MG/1
650 TABLET ORAL EVERY 6 HOURS
Status: DISCONTINUED | OUTPATIENT
Start: 2025-01-14 | End: 2025-01-15 | Stop reason: HOSPADM

## 2025-01-14 RX ORDER — GLYCOPYRROLATE 0.2 MG/ML
INJECTION INTRAMUSCULAR; INTRAVENOUS AS NEEDED
Status: DISCONTINUED | OUTPATIENT
Start: 2025-01-14 | End: 2025-01-14

## 2025-01-14 RX ORDER — HYDROMORPHONE HYDROCHLORIDE 0.2 MG/ML
0.2 INJECTION INTRAMUSCULAR; INTRAVENOUS; SUBCUTANEOUS EVERY 4 HOURS PRN
Status: DISCONTINUED | OUTPATIENT
Start: 2025-01-14 | End: 2025-01-15 | Stop reason: HOSPADM

## 2025-01-14 RX ORDER — LABETALOL HYDROCHLORIDE 5 MG/ML
5 INJECTION, SOLUTION INTRAVENOUS ONCE AS NEEDED
Status: DISCONTINUED | OUTPATIENT
Start: 2025-01-14 | End: 2025-01-14 | Stop reason: HOSPADM

## 2025-01-14 RX ORDER — POLYETHYLENE GLYCOL 3350 17 G/17G
17 POWDER, FOR SOLUTION ORAL 2 TIMES DAILY
Status: DISCONTINUED | OUTPATIENT
Start: 2025-01-14 | End: 2025-01-15 | Stop reason: HOSPADM

## 2025-01-14 RX ORDER — LIDOCAINE HYDROCHLORIDE 10 MG/ML
0.1 INJECTION, SOLUTION INFILTRATION; PERINEURAL ONCE
Status: DISCONTINUED | OUTPATIENT
Start: 2025-01-14 | End: 2025-01-14 | Stop reason: HOSPADM

## 2025-01-14 RX ORDER — OXYCODONE HYDROCHLORIDE 5 MG/1
5 TABLET ORAL EVERY 4 HOURS PRN
Status: DISCONTINUED | OUTPATIENT
Start: 2025-01-14 | End: 2025-01-15 | Stop reason: HOSPADM

## 2025-01-14 RX ADMIN — FENTANYL CITRATE 25 MCG: 50 INJECTION, SOLUTION INTRAMUSCULAR; INTRAVENOUS at 11:29

## 2025-01-14 RX ADMIN — ACETAMINOPHEN 650 MG: 325 TABLET ORAL at 22:30

## 2025-01-14 RX ADMIN — SUGAMMADEX 400 MG: 100 INJECTION, SOLUTION INTRAVENOUS at 13:54

## 2025-01-14 RX ADMIN — HYDROMORPHONE HYDROCHLORIDE 0.25 MG: 1 INJECTION, SOLUTION INTRAMUSCULAR; INTRAVENOUS; SUBCUTANEOUS at 12:35

## 2025-01-14 RX ADMIN — SODIUM CHLORIDE, SODIUM LACTATE, POTASSIUM CHLORIDE, AND CALCIUM CHLORIDE: 600; 310; 30; 20 INJECTION, SOLUTION INTRAVENOUS at 11:10

## 2025-01-14 RX ADMIN — HEPARIN SODIUM 5000 UNITS: 5000 INJECTION, SOLUTION INTRAVENOUS; SUBCUTANEOUS at 23:42

## 2025-01-14 RX ADMIN — LIDOCAINE HYDROCHLORIDE 5 ML: 20 INJECTION, SOLUTION INFILTRATION; PERINEURAL at 11:24

## 2025-01-14 RX ADMIN — ROCURONIUM BROMIDE 10 MG: 10 INJECTION INTRAVENOUS at 13:24

## 2025-01-14 RX ADMIN — HYDROMORPHONE HYDROCHLORIDE 0.5 MG: 1 INJECTION, SOLUTION INTRAMUSCULAR; INTRAVENOUS; SUBCUTANEOUS at 14:07

## 2025-01-14 RX ADMIN — ONDANSETRON 4 MG: 2 INJECTION INTRAMUSCULAR; INTRAVENOUS at 13:35

## 2025-01-14 RX ADMIN — HYDROMORPHONE HYDROCHLORIDE 0.25 MG: 1 INJECTION, SOLUTION INTRAMUSCULAR; INTRAVENOUS; SUBCUTANEOUS at 12:56

## 2025-01-14 RX ADMIN — ROCURONIUM BROMIDE 20 MG: 10 INJECTION INTRAVENOUS at 12:35

## 2025-01-14 RX ADMIN — FENTANYL CITRATE 50 MCG: 50 INJECTION, SOLUTION INTRAMUSCULAR; INTRAVENOUS at 11:24

## 2025-01-14 RX ADMIN — ROCURONIUM BROMIDE 60 MG: 10 INJECTION INTRAVENOUS at 11:25

## 2025-01-14 RX ADMIN — GLYCOPYRROLATE 0.2 MG: 0.2 INJECTION, SOLUTION INTRAMUSCULAR; INTRAVENOUS at 11:10

## 2025-01-14 RX ADMIN — PROPOFOL 85 MCG/KG/MIN: 10 INJECTION, EMULSION INTRAVENOUS at 11:30

## 2025-01-14 RX ADMIN — FENTANYL CITRATE 25 MCG: 50 INJECTION, SOLUTION INTRAMUSCULAR; INTRAVENOUS at 11:43

## 2025-01-14 RX ADMIN — ACETAMINOPHEN 650 MG: 325 TABLET ORAL at 16:27

## 2025-01-14 RX ADMIN — CYCLOBENZAPRINE 10 MG: 10 TABLET, FILM COATED ORAL at 20:33

## 2025-01-14 RX ADMIN — CYCLOBENZAPRINE 10 MG: 10 TABLET, FILM COATED ORAL at 16:27

## 2025-01-14 RX ADMIN — CEFAZOLIN 2 G: 1 INJECTION, POWDER, FOR SOLUTION INTRAMUSCULAR; INTRAVENOUS at 11:40

## 2025-01-14 RX ADMIN — ROCURONIUM BROMIDE 10 MG: 10 INJECTION INTRAVENOUS at 12:56

## 2025-01-14 RX ADMIN — DEXAMETHASONE SODIUM PHOSPHATE 10 MG: 4 INJECTION INTRA-ARTICULAR; INTRALESIONAL; INTRAMUSCULAR; INTRAVENOUS; SOFT TISSUE at 11:35

## 2025-01-14 RX ADMIN — ROCURONIUM BROMIDE 20 MG: 10 INJECTION INTRAVENOUS at 11:45

## 2025-01-14 RX ADMIN — PROPOFOL 200 MG: 10 INJECTION, EMULSION INTRAVENOUS at 11:24

## 2025-01-14 RX ADMIN — MIDAZOLAM HYDROCHLORIDE 2 MG: 1 INJECTION, SOLUTION INTRAMUSCULAR; INTRAVENOUS at 11:10

## 2025-01-14 SDOH — ECONOMIC STABILITY: FOOD INSECURITY: WITHIN THE PAST 12 MONTHS, YOU WORRIED THAT YOUR FOOD WOULD RUN OUT BEFORE YOU GOT THE MONEY TO BUY MORE.: NEVER TRUE

## 2025-01-14 SDOH — ECONOMIC STABILITY: HOUSING INSECURITY: IN THE PAST 12 MONTHS, HOW MANY TIMES HAVE YOU MOVED WHERE YOU WERE LIVING?: 0

## 2025-01-14 SDOH — ECONOMIC STABILITY: FOOD INSECURITY: WITHIN THE PAST 12 MONTHS, THE FOOD YOU BOUGHT JUST DIDN'T LAST AND YOU DIDN'T HAVE MONEY TO GET MORE.: NEVER TRUE

## 2025-01-14 SDOH — SOCIAL STABILITY: SOCIAL INSECURITY: DOES ANYONE TRY TO KEEP YOU FROM HAVING/CONTACTING OTHER FRIENDS OR DOING THINGS OUTSIDE YOUR HOME?: NO

## 2025-01-14 SDOH — ECONOMIC STABILITY: INCOME INSECURITY: IN THE PAST 12 MONTHS HAS THE ELECTRIC, GAS, OIL, OR WATER COMPANY THREATENED TO SHUT OFF SERVICES IN YOUR HOME?: NO

## 2025-01-14 SDOH — ECONOMIC STABILITY: HOUSING INSECURITY: AT ANY TIME IN THE PAST 12 MONTHS, WERE YOU HOMELESS OR LIVING IN A SHELTER (INCLUDING NOW)?: NO

## 2025-01-14 SDOH — ECONOMIC STABILITY: HOUSING INSECURITY: IN THE LAST 12 MONTHS, WAS THERE A TIME WHEN YOU WERE NOT ABLE TO PAY THE MORTGAGE OR RENT ON TIME?: NO

## 2025-01-14 SDOH — SOCIAL STABILITY: SOCIAL INSECURITY
WITHIN THE LAST YEAR, HAVE YOU BEEN KICKED, HIT, SLAPPED, OR OTHERWISE PHYSICALLY HURT BY YOUR PARTNER OR EX-PARTNER?: NO

## 2025-01-14 SDOH — SOCIAL STABILITY: SOCIAL INSECURITY: ARE THERE ANY APPARENT SIGNS OF INJURIES/BEHAVIORS THAT COULD BE RELATED TO ABUSE/NEGLECT?: NO

## 2025-01-14 SDOH — SOCIAL STABILITY: SOCIAL INSECURITY: DO YOU FEEL UNSAFE GOING BACK TO THE PLACE WHERE YOU ARE LIVING?: NO

## 2025-01-14 SDOH — SOCIAL STABILITY: SOCIAL INSECURITY: WITHIN THE LAST YEAR, HAVE YOU BEEN AFRAID OF YOUR PARTNER OR EX-PARTNER?: NO

## 2025-01-14 SDOH — SOCIAL STABILITY: SOCIAL INSECURITY: HAS ANYONE EVER THREATENED TO HURT YOUR FAMILY OR YOUR PETS?: NO

## 2025-01-14 SDOH — SOCIAL STABILITY: SOCIAL INSECURITY: ABUSE: ADULT

## 2025-01-14 SDOH — SOCIAL STABILITY: SOCIAL INSECURITY: WITHIN THE LAST YEAR, HAVE YOU BEEN HUMILIATED OR EMOTIONALLY ABUSED IN OTHER WAYS BY YOUR PARTNER OR EX-PARTNER?: NO

## 2025-01-14 SDOH — SOCIAL STABILITY: SOCIAL INSECURITY
WITHIN THE LAST YEAR, HAVE YOU BEEN RAPED OR FORCED TO HAVE ANY KIND OF SEXUAL ACTIVITY BY YOUR PARTNER OR EX-PARTNER?: NO

## 2025-01-14 SDOH — SOCIAL STABILITY: SOCIAL INSECURITY: HAVE YOU HAD THOUGHTS OF HARMING ANYONE ELSE?: NO

## 2025-01-14 SDOH — ECONOMIC STABILITY: FOOD INSECURITY: HOW HARD IS IT FOR YOU TO PAY FOR THE VERY BASICS LIKE FOOD, HOUSING, MEDICAL CARE, AND HEATING?: NOT HARD AT ALL

## 2025-01-14 SDOH — ECONOMIC STABILITY: TRANSPORTATION INSECURITY: IN THE PAST 12 MONTHS, HAS LACK OF TRANSPORTATION KEPT YOU FROM MEDICAL APPOINTMENTS OR FROM GETTING MEDICATIONS?: NO

## 2025-01-14 SDOH — SOCIAL STABILITY: SOCIAL INSECURITY: ARE YOU OR HAVE YOU BEEN THREATENED OR ABUSED PHYSICALLY, EMOTIONALLY, OR SEXUALLY BY ANYONE?: NO

## 2025-01-14 SDOH — SOCIAL STABILITY: SOCIAL INSECURITY: DO YOU FEEL ANYONE HAS EXPLOITED OR TAKEN ADVANTAGE OF YOU FINANCIALLY OR OF YOUR PERSONAL PROPERTY?: NO

## 2025-01-14 SDOH — SOCIAL STABILITY: SOCIAL INSECURITY: WERE YOU ABLE TO COMPLETE ALL THE BEHAVIORAL HEALTH SCREENINGS?: YES

## 2025-01-14 ASSESSMENT — COGNITIVE AND FUNCTIONAL STATUS - GENERAL
MOBILITY SCORE: 24
DAILY ACTIVITIY SCORE: 24
DAILY ACTIVITIY SCORE: 24
PATIENT BASELINE BEDBOUND: NO
MOBILITY SCORE: 24

## 2025-01-14 ASSESSMENT — PAIN SCALES - GENERAL
PAINLEVEL_OUTOF10: 0 - NO PAIN
PAIN_LEVEL: 3
PAINLEVEL_OUTOF10: 1
PAINLEVEL_OUTOF10: 0 - NO PAIN
PAINLEVEL_OUTOF10: 3
PAINLEVEL_OUTOF10: 5 - MODERATE PAIN
PAINLEVEL_OUTOF10: 2
PAINLEVEL_OUTOF10: 0 - NO PAIN

## 2025-01-14 ASSESSMENT — PAIN DESCRIPTION - LOCATION: LOCATION: NECK

## 2025-01-14 ASSESSMENT — ACTIVITIES OF DAILY LIVING (ADL)
FEEDING YOURSELF: INDEPENDENT
DRESSING YOURSELF: INDEPENDENT
BATHING: INDEPENDENT
TOILETING: INDEPENDENT
GROOMING: INDEPENDENT
WALKS IN HOME: INDEPENDENT
PATIENT'S MEMORY ADEQUATE TO SAFELY COMPLETE DAILY ACTIVITIES?: YES
LACK_OF_TRANSPORTATION: NO
LACK_OF_TRANSPORTATION: NO
HEARING - LEFT EAR: FUNCTIONAL
JUDGMENT_ADEQUATE_SAFELY_COMPLETE_DAILY_ACTIVITIES: YES
ADEQUATE_TO_COMPLETE_ADL: YES
HEARING - RIGHT EAR: FUNCTIONAL

## 2025-01-14 ASSESSMENT — PAIN DESCRIPTION - DESCRIPTORS
DESCRIPTORS: TIGHTNESS
DESCRIPTORS: NUMBNESS

## 2025-01-14 ASSESSMENT — PAIN - FUNCTIONAL ASSESSMENT
PAIN_FUNCTIONAL_ASSESSMENT: 0-10

## 2025-01-14 ASSESSMENT — PAIN DESCRIPTION - ORIENTATION: ORIENTATION: POSTERIOR

## 2025-01-14 ASSESSMENT — LIFESTYLE VARIABLES
AUDIT-C TOTAL SCORE: 2
HOW OFTEN DO YOU HAVE 6 OR MORE DRINKS ON ONE OCCASION: NEVER
HOW OFTEN DO YOU HAVE A DRINK CONTAINING ALCOHOL: 2-4 TIMES A MONTH
AUDIT-C TOTAL SCORE: 2
HOW MANY STANDARD DRINKS CONTAINING ALCOHOL DO YOU HAVE ON A TYPICAL DAY: 1 OR 2
SKIP TO QUESTIONS 9-10: 1

## 2025-01-14 ASSESSMENT — COLUMBIA-SUICIDE SEVERITY RATING SCALE - C-SSRS
6. HAVE YOU EVER DONE ANYTHING, STARTED TO DO ANYTHING, OR PREPARED TO DO ANYTHING TO END YOUR LIFE?: NO
1. IN THE PAST MONTH, HAVE YOU WISHED YOU WERE DEAD OR WISHED YOU COULD GO TO SLEEP AND NOT WAKE UP?: NO
2. HAVE YOU ACTUALLY HAD ANY THOUGHTS OF KILLING YOURSELF?: NO

## 2025-01-14 ASSESSMENT — PATIENT HEALTH QUESTIONNAIRE - PHQ9
2. FEELING DOWN, DEPRESSED OR HOPELESS: NOT AT ALL
SUM OF ALL RESPONSES TO PHQ9 QUESTIONS 1 & 2: 0
1. LITTLE INTEREST OR PLEASURE IN DOING THINGS: NOT AT ALL

## 2025-01-14 NOTE — ANESTHESIA POSTPROCEDURE EVALUATION
"Patient: Aryan Woods \"Viral\"    Procedure Summary       Date: 01/14/25 Room / Location: [facility] OR 23 / [facility] OR    Anesthesia Start: 1100 Anesthesia Stop: 1426    Procedure: DISCECTOMY, SPINE, CERVICAL, ANTERIOR APPROACH, WITH FUSION (Neck) Diagnosis:       Cervical myelopathy      (Cervical myelopathy [G95.9])    Surgeons: Shaheen Garcia MD Responsible Provider: Sung Chavez DO    Anesthesia Type: general ASA Status: Not recorded            Anesthesia Type: general    Vitals Value Taken Time   /78 01/14/25 1426   Temp 36.6 01/14/25 1426   Pulse 77 01/14/25 1426   Resp 16 01/14/25 1426   SpO2 99 01/14/25 1426       Anesthesia Post Evaluation    Patient location during evaluation: PACU  Patient participation: complete - patient participated  Level of consciousness: awake  Pain score: 3  Pain management: adequate  Multimodal analgesia pain management approach  Airway patency: patent  Two or more strategies used to mitigate risk of obstructive sleep apnea  Cardiovascular status: acceptable  Respiratory status: acceptable, airway suctioned and face mask  Hydration status: acceptable  Postoperative Nausea and Vomiting: none      No notable events documented.    "

## 2025-01-14 NOTE — OP NOTE
"DISCECTOMY, SPINE, CERVICAL, ANTERIOR APPROACH, WITH FUSION Operative Note     Date: 2025  OR Location: [facility] OR    Name: Aryan Woods \"Viral\", : 1980, Age: 44 y.o., MRN: [id number], Sex: male    Diagnosis  Pre-op Diagnosis      * Cervical myelopathy [G95.9] Post-op Diagnosis     * Cervical myelopathy [G95.9]     Procedures  DISCECTOMY, SPINE, CERVICAL, ANTERIOR APPROACH, WITH FUSION  00330 - OK ARTHRD ANT INTERBODY DECOMPRESS CERVICAL BELW C2    OK ALLOGRAFT FOR SPINE SURGERY ONLY STRUCTURAL [64356]  OK ANTERIOR INSTRUMENTATION 2-3 VERTEBRAL SEGMENTS [08573]  Surgeons      * Shaheen Garcia - Primary    Resident/Fellow/Other Assistant:  Surgeons and Role:     * Demarco Leal MD - Resident - Assisting    Staff:   Circulator: Ruddy  Scrub Person: Danielle  Circulator: Galilea  Scrub Person: Ruddy Johnson Circulator: Erika Johnson Scrub: Adelia    Anesthesia Staff: Anesthesiologist: Sung Chavez DO  CRNA: GAVIN Delarosa-CRNA  C-AA: ARLINE Ogden    Procedure Summary  Anesthesia: Anesthesia type not filed in the log.  ASA: ASA status not filed in the log.  Estimated Blood Loss: 20mL  Intra-op Medications:   Administrations occurring from 0952 to 1347 on 25:   Medication Name Total Dose   lidocaine-epinephrine PF (Xylocaine W/EPI) 1 %-1:200,000 injection 6 mL   polymyxin B 500,000 Units in sodium chloride 0.9 % 1,000 mL irrigation 1,000 mL   ceFAZolin (Ancef) vial 1 g 2 g   dexAMETHasone (Decadron) injection 4 mg/mL 10 mg   dexmedeTOMIDine (Precedex) bolus from bag 20 mcg   fentaNYL (Sublimaze) injection 50 mcg/mL 100 mcg   glycopyrrolate (Robinul) injection 0.2 mg   HYDROmorphone (Dilaudid) injection 1 mg/mL 0.5 mg   LR bolus Cannot be calculated   lidocaine (Xylocaine) 2 % 5 mL   midazolam PF (Versed) injection 1 mg/mL 2 mg   ondansetron (Zofran) 2 mg/mL injection 4 mg   propofol (Diprivan) injection 10 mg/mL 1,377.51 mg   rocuronium (ZeMuron) 50 mg/5 mL injection 120 mg " "             Anesthesia Record               Intraprocedure I/O Totals          Intake    Dexmedetomidine 0.00 mL    The total shown is the total volume documented since Anesthesia Start was filed.    Total Intake 0 mL          Specimen: No specimens collected              Drains and/or Catheters:   Closed/Suction Drain Neck Bulb 10 Fr. (Active)       Tourniquet Times:         Implants:  Implants       Type Name Action Serial No.      Spinal Hardware PIN, DISTRACTION, CERVICAL, 16 MM, STAINLESS STEEL, DISPOSABLE, STERILE - [id number] Wasted      Spinal Hardware PIN, DISTRACTION, CERVICAL, 16 MM, STAINLESS STEEL, DISPOSABLE, STERILE - [id number] Implanted      Spinal Hardware PIN, DISTRACTION, CERVICAL, 16 MM, STAINLESS STEEL, DISPOSABLE, STERILE - [id number] Implanted      Spinal Hardware ALLOGRAFT, TRIAD LORDOTIC 7 X 11 X 14 - [id number] Implanted       3.5 X 13MM SELF DRILLING SCREW Implanted       1.9H 18MM PLATE Implanted               Findings: good placement of hardware    Indications: Aryan Woods \"Zaid" is an 44 y.o. male who is having surgery for Cervical myelopathy [G95.9].     The patient was seen in the preoperative area. The risks, benefits, complications, treatment options, non-operative alternatives, expected recovery and outcomes were discussed with the patient. The possibilities of reaction to medication, pulmonary aspiration, injury to surrounding structures, bleeding, recurrent infection, the need for additional procedures, failure to diagnose a condition, and creating a complication requiring transfusion or operation were discussed with the patient. The patient concurred with the proposed plan, giving informed consent.  The site of surgery was properly noted/marked if necessary per policy. The patient has been actively warmed in preoperative area. Preoperative antibiotics have been ordered and given within 1 hours of incision. Venous thrombosis prophylaxis have been ordered including " bilateral sequential compression devices    Procedure Details:     The patient was brought back from preop to OR. A safety huddle was performed and anesthesia was induced. Patient was attached to neuromonitoring with baseline SSEPs confirmed. Proper levels were confirmed with X-ray. The incision was marked, and the  neck was cleansed, prepped and draped in usual sterile fashion. Local anesthetic was used to infiltrate skin over incision.    Dissection was carried out with blunt and sharp dissection. The platysma was overmined, divided, and dissection was further carried out between sternoclidomastoid/carotid artery and esophagus/trachea in the avascular plane until the prevertebral fascia and longus colli were identified. This fascia was dissected with peanuts, and spinal needle with two 90-degree bends was placed into interspace, confirming the C4- 5 disc with fluoroscopy. A radiolucent self-retaining retractor was placed into the field, and Kaspar pins were placed into the C 4 and C5 vertebral bodies, again using fluoroscopy to confirm good positioning.     Once this was completed, modest distraction was placed between C4 and C5  vertebral bodies to better expose C4 - 5 intervertebral disc. An annulotomy was made with 11-blade, and discectomy was performed with a combination of kerrasins, pituitary grasper, and currettes. Discectomy was performed and all disc fragments were removed. Posterior longitudinal ligament was opened with blunt nerve hook and angled curette, and further discectomy was performed. This was continued until blunt nerve hook could easily be passed into neural foramina on either side and under either vertebral bodies rostrally and caudally. Drill was used to gently prepare the endplates.    For the C4- C5 level, a 7 mm-trial spacer graft was placed into interspace, and X-ray was taken. After obtaining good hemostasis, an 7 -mm spacer graft was placed into interspace with confirmation of good  positioning on fluoroscopy. Next, an 18mm plate was placed and 4  holes were drilled and then screws were placed and the final tighteners were locked.    The incision was copiously irrigated with Irricept and saline. Meticulous hemostasis was achieved with floseal and bipolar. A 10 round drain was tunneled out laterally and secured with a 2-0 silk stitched. The incision was then closed in a layered fashion using 3-0 interrupted vicryl suture. The skin was closed with a 4-0 monocryl followed by skin glue. SSEPs remained stable throughout the case. The patient was turned over to anesthesia for extubation without issue.      Complications:  None; patient tolerated the procedure well.    Disposition: PACU - hemodynamically stable.  Condition: stable                 Additional Details: none    Attending Attestation: I was present for the entire procedure.    Shaheen Garcia  Phone Number: [phone]

## 2025-01-14 NOTE — CARE PLAN
Problem: Skin  Goal: Decreased wound size/increased tissue granulation at next dressing change  Outcome: Met  Goal: Participates in plan/prevention/treatment measures  Outcome: Met  Goal: Prevent/manage excess moisture  Outcome: Met  Goal: Prevent/minimize sheer/friction injuries  Outcome: Met  Goal: Promote/optimize nutrition  Outcome: Met  Goal: Promote skin healing  Outcome: Met     Problem: Pain  Goal: Takes deep breaths with improved pain control throughout the shift  Outcome: Met  Goal: Turns in bed with improved pain control throughout the shift  Outcome: Met  Goal: Walks with improved pain control throughout the shift  Outcome: Met  Goal: Performs ADL's with improved pain control throughout shift  Outcome: Met  Goal: Free from opioid side effects throughout the shift  Outcome: Met  Goal: Free from acute confusion related to pain meds throughout the shift  Outcome: Met     Problem: Fall/Injury  Goal: Not fall by end of shift  Outcome: Met  Goal: Be free from injury by end of the shift  Outcome: Met  Goal: Verbalize understanding of personal risk factors for fall in the hospital  Outcome: Met  Goal: Verbalize understanding of risk factor reduction measures to prevent injury from fall in the home  Outcome: Met  Goal: Use assistive devices by end of the shift  Outcome: Met  Goal: Pace activities to prevent fatigue by end of the shift  Outcome: Met   The patient's goals for the shift include      The clinical goals for the shift include Remain neurologically intact throughout the shift.

## 2025-01-14 NOTE — ANESTHESIA PROCEDURE NOTES
Peripheral IV  Date/Time: 1/14/2025 11:33 AM      Placement  Needle size: 16 G  Laterality: left  Location: hand  Site prep: alcohol  Technique: anatomical landmarks  Attempts: 1

## 2025-01-14 NOTE — ANESTHESIA PROCEDURE NOTES
Airway  Date/Time: 1/14/2025 11:29 AM  Urgency: elective    Airway not difficult    Staffing  Performed: ARLINE   Authorized by: Sung Chavez DO    Performed by: ARLINE Ogden  Patient location during procedure: OR    Indications and Patient Condition  Indications for airway management: anesthesia  Spontaneous Ventilation: absent  Sedation level: deep  Preoxygenated: yes  Patient position: sniffing  MILS maintained throughout  Mask difficulty assessment: 3 - difficult mask (inadequate, unstable or two providers) +/- NMBA    Final Airway Details  Final airway type: endotracheal airway      Successful airway: ETT  Cuffed: yes   Successful intubation technique: video laryngoscopy  Facilitating devices/methods: intubating stylet  Endotracheal tube insertion site: oral  Blade: Jesus  Blade size: #4  ETT size (mm): 7.5  Cormack-Lehane Classification: grade I - full view of glottis  Placement verified by: chest auscultation and capnometry   Measured from: lips  ETT to lips (cm): 23  Number of attempts at approach: 1

## 2025-01-14 NOTE — HOSPITAL COURSE
Aryan Woods is a 44 y.o. male with a past medical history of HTN, HLD, anxiety p/w L>R C5 numbness/radiculopathy with a large C4-5 disc herniation  1/14 s/p C4-5 ACDF  1/15 drain d/c'd, upright XR hardware in position    PT/OT evaluated patient and recommended no further therapy at discharge.      On the day of discharge, the patient was seen and evaluated by the neurosurgery team and deemed suitable for discharge. The patient was given detailed discharge instructions and were scheduled to follow up as an outpatient.

## 2025-01-15 ENCOUNTER — APPOINTMENT (OUTPATIENT)
Dept: RADIOLOGY | Facility: HOSPITAL | Age: 45
End: 2025-01-15
Payer: COMMERCIAL

## 2025-01-15 VITALS
SYSTOLIC BLOOD PRESSURE: 130 MMHG | RESPIRATION RATE: 16 BRPM | WEIGHT: 186.29 LBS | TEMPERATURE: 98.2 F | HEART RATE: 73 BPM | OXYGEN SATURATION: 94 % | BODY MASS INDEX: 27.51 KG/M2 | DIASTOLIC BLOOD PRESSURE: 78 MMHG

## 2025-01-15 LAB
ANION GAP SERPL CALC-SCNC: 15 MMOL/L (ref 10–20)
BUN SERPL-MCNC: 10 MG/DL (ref 6–23)
CALCIUM SERPL-MCNC: 9.7 MG/DL (ref 8.6–10.6)
CHLORIDE SERPL-SCNC: 103 MMOL/L (ref 98–107)
CO2 SERPL-SCNC: 24 MMOL/L (ref 21–32)
CREAT SERPL-MCNC: 0.84 MG/DL (ref 0.5–1.3)
EGFRCR SERPLBLD CKD-EPI 2021: >90 ML/MIN/1.73M*2
ERYTHROCYTE [DISTWIDTH] IN BLOOD BY AUTOMATED COUNT: 12 % (ref 11.5–14.5)
GLUCOSE SERPL-MCNC: 99 MG/DL (ref 74–99)
HCT VFR BLD AUTO: 42.5 % (ref 41–52)
HGB BLD-MCNC: 14.5 G/DL (ref 13.5–17.5)
MCH RBC QN AUTO: 29.7 PG (ref 26–34)
MCHC RBC AUTO-ENTMCNC: 34.1 G/DL (ref 32–36)
MCV RBC AUTO: 87 FL (ref 80–100)
NRBC BLD-RTO: 0 /100 WBCS (ref 0–0)
PLATELET # BLD AUTO: 293 X10*3/UL (ref 150–450)
POTASSIUM SERPL-SCNC: 3.7 MMOL/L (ref 3.5–5.3)
RBC # BLD AUTO: 4.89 X10*6/UL (ref 4.5–5.9)
SODIUM SERPL-SCNC: 138 MMOL/L (ref 136–145)
WBC # BLD AUTO: 11.5 X10*3/UL (ref 4.4–11.3)

## 2025-01-15 PROCEDURE — 85027 COMPLETE CBC AUTOMATED: CPT | Performed by: STUDENT IN AN ORGANIZED HEALTH CARE EDUCATION/TRAINING PROGRAM

## 2025-01-15 PROCEDURE — 2500000004 HC RX 250 GENERAL PHARMACY W/ HCPCS (ALT 636 FOR OP/ED): Performed by: STUDENT IN AN ORGANIZED HEALTH CARE EDUCATION/TRAINING PROGRAM

## 2025-01-15 PROCEDURE — 72040 X-RAY EXAM NECK SPINE 2-3 VW: CPT | Performed by: RADIOLOGY

## 2025-01-15 PROCEDURE — 80048 BASIC METABOLIC PNL TOTAL CA: CPT | Performed by: STUDENT IN AN ORGANIZED HEALTH CARE EDUCATION/TRAINING PROGRAM

## 2025-01-15 PROCEDURE — 99239 HOSP IP/OBS DSCHRG MGMT >30: CPT

## 2025-01-15 PROCEDURE — 2500000001 HC RX 250 WO HCPCS SELF ADMINISTERED DRUGS (ALT 637 FOR MEDICARE OP): Performed by: STUDENT IN AN ORGANIZED HEALTH CARE EDUCATION/TRAINING PROGRAM

## 2025-01-15 PROCEDURE — 7100000011 HC EXTENDED STAY RECOVERY HOURLY - NURSING UNIT

## 2025-01-15 PROCEDURE — 72040 X-RAY EXAM NECK SPINE 2-3 VW: CPT

## 2025-01-15 PROCEDURE — 97165 OT EVAL LOW COMPLEX 30 MIN: CPT | Mod: GO

## 2025-01-15 PROCEDURE — 97161 PT EVAL LOW COMPLEX 20 MIN: CPT | Mod: GP

## 2025-01-15 PROCEDURE — 36415 COLL VENOUS BLD VENIPUNCTURE: CPT | Performed by: STUDENT IN AN ORGANIZED HEALTH CARE EDUCATION/TRAINING PROGRAM

## 2025-01-15 RX ORDER — AMOXICILLIN 250 MG
2 CAPSULE ORAL 2 TIMES DAILY
Qty: 28 TABLET | Refills: 0 | Status: SHIPPED | OUTPATIENT
Start: 2025-01-15

## 2025-01-15 RX ORDER — ONDANSETRON 4 MG/1
4 TABLET, FILM COATED ORAL EVERY 12 HOURS PRN
Qty: 20 TABLET | Refills: 0 | Status: SHIPPED | OUTPATIENT
Start: 2025-01-15

## 2025-01-15 RX ORDER — CYCLOBENZAPRINE HCL 10 MG
10 TABLET ORAL 3 TIMES DAILY PRN
Qty: 28 TABLET | Refills: 0 | Status: SHIPPED | OUTPATIENT
Start: 2025-01-15

## 2025-01-15 RX ORDER — OXYCODONE HYDROCHLORIDE 5 MG/1
5 TABLET ORAL EVERY 6 HOURS PRN
Qty: 28 TABLET | Refills: 0 | Status: SHIPPED | OUTPATIENT
Start: 2025-01-15

## 2025-01-15 RX ORDER — POLYETHYLENE GLYCOL 3350 17 G/17G
17 POWDER, FOR SOLUTION ORAL 2 TIMES DAILY
Qty: 14 EACH | Refills: 0 | Status: SHIPPED | OUTPATIENT
Start: 2025-01-15

## 2025-01-15 RX ADMIN — CYCLOBENZAPRINE 10 MG: 10 TABLET, FILM COATED ORAL at 14:30

## 2025-01-15 RX ADMIN — CYCLOBENZAPRINE 10 MG: 10 TABLET, FILM COATED ORAL at 08:14

## 2025-01-15 RX ADMIN — HEPARIN SODIUM 5000 UNITS: 5000 INJECTION, SOLUTION INTRAVENOUS; SUBCUTANEOUS at 08:14

## 2025-01-15 RX ADMIN — ACETAMINOPHEN 650 MG: 325 TABLET ORAL at 04:07

## 2025-01-15 RX ADMIN — ACETAMINOPHEN 650 MG: 325 TABLET ORAL at 11:07

## 2025-01-15 ASSESSMENT — COGNITIVE AND FUNCTIONAL STATUS - GENERAL
WALKING IN HOSPITAL ROOM: A LITTLE
DAILY ACTIVITIY SCORE: 24
CLIMB 3 TO 5 STEPS WITH RAILING: A LITTLE
MOBILITY SCORE: 22

## 2025-01-15 ASSESSMENT — ACTIVITIES OF DAILY LIVING (ADL)
ADL_ASSISTANCE: INDEPENDENT
ADL_ASSISTANCE: INDEPENDENT
LACK_OF_TRANSPORTATION: NO
BATHING_ASSISTANCE: INDEPENDENT

## 2025-01-15 ASSESSMENT — PAIN - FUNCTIONAL ASSESSMENT
PAIN_FUNCTIONAL_ASSESSMENT: 0-10
PAIN_FUNCTIONAL_ASSESSMENT: 0-10

## 2025-01-15 ASSESSMENT — PAIN SCALES - GENERAL: PAINLEVEL_OUTOF10: 5 - MODERATE PAIN

## 2025-01-15 ASSESSMENT — PAIN DESCRIPTION - ORIENTATION: ORIENTATION: ANTERIOR

## 2025-01-15 ASSESSMENT — PAIN SCALES - WONG BAKER: WONGBAKER_NUMERICALRESPONSE: HURTS LITTLE BIT

## 2025-01-15 NOTE — PROGRESS NOTES
"Physical Therapy    Physical Therapy Evaluation    Patient Name: Aryan Woods \"Viral\"  MRN: [id number]  Department: [place] [address]  Room: [address] [place] [address]  Today's Date: 1/15/2025   Time Calculation  Start Time: 1000  Stop Time: 1010  Time Calculation (min): 10 min    Assessment/Plan   PT Assessment  PT Assessment Results: Decreased mobility  Rehab Prognosis: Excellent  Barriers to Discharge Home: No anticipated barriers  Evaluation/Treatment Tolerance: Patient tolerated treatment well  Medical Staff Made Aware: Yes  End of Session Communication: Bedside nurse  Assessment Comment: Patient is a 43yo M s/p C4-5 ACDF. Patient is indep at baseline and lives alone. Patient indep-supervision for mobility. no further PT needs, check in per [facility] requirements  End of Session Patient Position: Bed, 3 rail up, Alarm off, not on at start of session  IP OR SWING BED PT PLAN  Inpatient or Swing Bed: Inpatient  PT Plan  Treatment/Interventions: Bed mobility, Transfer training, Stair training, Gait training, Balance training  PT Plan: Ongoing PT (check in per [facility] requirements)  PT Frequency: Daily (per [facility] requirements)  PT Discharge Recommendations: No PT needed after discharge  PT Recommended Transfer Status: Independent  PT - OK to Discharge: Yes    Subjective   General Visit Information:  General  Reason for Referral: s/p C4-5 ACDF  Past Medical History Relevant to Rehab: HTN, HLD, anxiety  Prior to Session Communication: Bedside nurse  Patient Position Received: Bed, 3 rail up  General Comment: pt supine in bed, RN cleared. pt pleasant and cooperative  Home Living:  Home Living  Type of Home: House  Lives With: Alone  Home Adaptive Equipment: None  Home Layout: Bed/bath upstairs, Able to live on main level with bedroom/bathroom  Home Access: Stairs to enter with rails  Entrance Stairs-Number of Steps: 2  Bathroom Shower/Tub: Walk-in shower  Prior Level of Function:  Prior Function Per Pt/Caregiver Report  Level of [place]: " Independent with ADLs and functional transfers, Independent with homemaking with ambulation  ADL Assistance: Independent  Homemaking Assistance: Independent  Ambulatory Assistance: Independent  Prior Function Comments: + drive  Precautions:  Precautions  Medical Precautions: Fall precautions  Post-Surgical Precautions: Spinal precautions    Objective   Pain:  Pain Assessment  Pain Assessment: 0-10  0-10 (Numeric) Pain Score:  (denies pain, reports general soreness)  Cognition:  Cognition  Overall Cognitive Status: Within Functional Limits  Orientation Level: Oriented X4    General Assessments:     Activity Tolerance  Endurance: Tolerates 10 - 20 min exercise with multiple rests    Sensation  Light Touch:  (reports some tingling in L hand)       Dynamic Sitting Balance  Dynamic Sitting-Comments: don pants sitting EOB indep  Functional Assessments:  Bed Mobility  Bed Mobility: Yes  Bed Mobility 1  Bed Mobility 1: Supine to sitting, Sitting to supine  Level of Assistance 1: Independent  Bed Mobility Comments 1: HOB elevated slightly    Transfers  Transfer: Yes  Transfer 1  Transfer From 1: Sit to, Stand to  Transfer to 1: Stand, Sit  Technique 1: Sit to stand, Stand to sit  Transfer Level of Assistance 1: Independent  Trials/Comments 1: stood from EOB.    Ambulation/Gait Training  Ambulation/Gait Training Performed: Yes  Ambulation/Gait Training 1  Surface 1: Level tile  Device 1: No device  Assistance 1: Distant supervision  Comments/Distance (ft) 1: ambulated 100' x2    Stairs  Stairs: Yes  Stairs  Rails 1: None (Comment)  Device 1: No device  Assistance 1: Distant supervision  Comment/Number of Steps 1: 10 steps alternating  Extremity/Trunk Assessments:  RLE   RLE : Within Functional Limits  LLE   LLE : Within Functional Limits  Outcome Measures:  [facility] Basic Mobility  Turning from your back to your side while in a flat bed without using bedrails: None  Moving from lying on your back to sitting on the side of a flat  bed without using bedrails: None  Moving to and from bed to chair (including a wheelchair): None  Standing up from a chair using your arms (e.g. wheelchair or bedside chair): None  To walk in hospital room: A little  Climbing 3-5 steps with railing: A little  Basic Mobility - Total Score: 22    Encounter Problems       Encounter Problems (Active)       Mobility       Pt will continue to demonstrate independence with bed mobility, transfers, ambulation, stair navigation and safe use of spinal precautions per nursing and patient report. (Progressing)       Start:  01/15/25    Expected End:  01/22/25               Pain - Adult              Education Documentation  Precautions, taught by Machelle Day PT at 1/15/2025 11:03 AM.  Learner: Patient  Readiness: Acceptance  Method: Explanation  Response: Verbalizes Understanding  Comment: cal on spinal precautions    Mobility Training, taught by Machelle Day PT at 1/15/2025 11:03 AM.  Learner: Patient  Readiness: Acceptance  Method: Explanation  Response: Verbalizes Understanding  Comment: cal on spinal precautions    Education Comments  No comments found.

## 2025-01-15 NOTE — PROGRESS NOTES
"Occupational Therapy    Evaluation & discharge    Patient Name: Aryan Woods \"Viral\"  MRN: [id number]  Department: [place] [address]  Room: [address] [place] [address]  Today's Date: 1/15/2025  Time Calculation  Start Time: 0920  Stop Time: 0934  Time Calculation (min): 14 min        Assessment:  OT Assessment: Edu on spinal precautions and modified techniques for functional tasks. Demo Ind. No further OT services warranted- pt in agreeance.  Prognosis: Good  Barriers to Discharge Home: No anticipated barriers  Evaluation/Treatment Tolerance: Patient tolerated treatment well  Medical Staff Made Aware: Yes  End of Session Communication: Bedside nurse  End of Session Patient Position: Bed, 3 rail up, Alarm off, not on at start of session  OT Assessment Results: Decreased ADL status, Decreased functional mobility, Decreased IADLs  Prognosis: Good  Barriers to Discharge: None  Evaluation/Treatment Tolerance: Patient tolerated treatment well  Medical Staff Made Aware: Yes  Strengths: Premorbid level of function, Physical health  Barriers to Participation:  (None)  Plan:  No Skilled OT: Independent with ADLs  OT Frequency: OT eval only  OT Discharge Recommendations: No OT needed after discharge  Equipment Recommended upon Discharge:  (None)  OT Recommended Transfer Status: Independent  OT - OK to Discharge: Yes       Subjective   Current Problem:  1. Cervical myelopathy        2. Post-op pain  cyclobenzaprine (Flexeril) 10 mg tablet    oxyCODONE (Roxicodone) 5 mg immediate release tablet      3. Constipation due to pain medication  polyethylene glycol (Glycolax, Miralax) 17 gram packet    sennosides-docusate sodium (Paula-Colace) 8.6-50 mg tablet        General:  General  Reason for Referral: s/p C4-5 ACDF  Past Medical History Relevant to Rehab: HTN, HLD, anxiety  Family/Caregiver Present: No  Prior to Session Communication: Bedside nurse  Patient Position Received: Bed, 3 rail up, Alarm off, not on at start of session  General Comment: Pt " "resting in bed upon arrival, pleasant and cooperative throughout.  Precautions:  Medical Precautions: Fall precautions  Post-Surgical Precautions: Spinal precautions      Pain:  Pain Assessment  Pain Assessment: 0-10  0-10 (Numeric) Pain Score:  (\"sore\")  Pain Location: Neck  Pain Orientation: Anterior    Objective   Cognition:  Overall Cognitive Status: Within Functional Limits  Orientation Level: Oriented X4  Attention: Within Functional Limits  Safety/Judgement: Within Functional Limits  Insight: Within function limits     Confusion Assessment Method (CAM)  Acute Onset and Fluctuating Course (1A): No     Home Living:  Type of Home: House  Lives With: Alone  Home Adaptive Equipment: None  Home Layout: Bed/bath upstairs  Home Access: Stairs to enter with rails  Entrance Stairs-Number of Steps: 2  Prior Function:  Level of [place]: Independent with ADLs and functional transfers, Independent with homemaking with ambulation  ADL Assistance: Independent  Homemaking Assistance: Independent  Ambulatory Assistance: Independent  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 edu on log roll technique; good carryover    Transfers  Transfer: Yes  Transfer 1  Transfer From 1: Bed to, Sit to  Transfer to 1: Stand  Technique 1: Sit to stand, Stand to sit  Transfer Level of Assistance 1: Independent      Functional " Mobility:  Functional Mobility  Functional Mobility Performed: Yes  Functional Mobility 1  Surface 1: Level tile  Device 1: No device  Assistance 1: Independent  Comments 1: household distance mobility  Sitting Balance:  Static Sitting Balance  Static Sitting-Balance Support: Feet unsupported  Static Sitting-Level of Assistance: Independent  Standing Balance:  Static Standing Balance  Static Standing-Balance Support: No upper extremity supported  Static Standing-Level of Assistance: Independent   Modalities:     Vision:Vision - Basic Assessment  Current Vision: No visual deficits  Sensation:  Light Touch: No apparent deficits  Sensation Comment: Endorses tingling in L hand  Strength:  Strength Comments: WFL  Perception:  Inattention/Neglect: Appears intact  Initiation: Appears intact  Motor Planning: Appears intact  Perseveration: Not present  Coordination:  Movements are Fluid and Coordinated: Yes   Hand Function:  Gross Grasp: Functional  Coordination: Functional  Extremities: RUE   RUE : Within Functional Limits and LUE   LUE: Within Functional Limits        Outcome Measures:[facility] Daily Activity  Putting on and taking off regular lower body clothing: None  Bathing (including washing, rinsing, drying): None  Putting on and taking off regular upper body clothing: None  Toileting, which includes using toilet, bedpan or urinal: None  Taking care of personal grooming such as brushing teeth: None  Eating Meals: None  Daily Activity - Total Score: 24         and Brief Confusion Assessment Method (bCAM)  Feature 1: Altered Mental Status or Fluctuating Course: No  CAM Result: CAM -    Education Documentation  No documentation found.  Education Comments  No comments found.        BETTE OLIVAREZ/L (PRN)

## 2025-01-15 NOTE — CARE PLAN
The patient's goals for the shift include  to ambulate throughout room.     The clinical goals for the shift include Pt will remain pain controlled throughout the shift.    Over the shift, the patient did not make progress toward the following goals. Barriers to progression include none. Recommendations to address these barriers include none.

## 2025-01-15 NOTE — PROGRESS NOTES
Department of Neurosurgery  Daily Progress Note    Patient Name: Aryan Woods  MRN: [id number]  Date:  01/15/25     Subjective  Patient states he is feeling good and had some food to eat, tolerated well. States that he has been voiding with no problem since post op.  Denies any fever, chills, night sweats, CP, SOB, palpitations, nausea, vomiting, or abdominal pain/discomfort.      Overnight Events  No acute events overnight    Procedures  1/14/25 C4-5 ACDF    Objective    Vital Signs  BP (!) 148/91   Pulse 69   Temp 36.2 °C (97.2 °F) (Temporal)   Resp 16   Wt 84.5 kg (186 lb 4.6 oz)   SpO2 96%   BMI 27.51 kg/m²      Physical Exam  Constitutional: A&Ox3, calm and cooperative, NAD.  Eyes: PERRL, clear sclera .  ENMT: Moist mucous membranes, no apparent injuries or lesions.  Head/Neck: Neck supple, no JVD. NC/AT.   Cardiovascular: Normal rate and regular rhythm. 2+ equal pulses of the distal extremities.  Respiratory/Thorax: CTAB, regular respirations on RA. Good symmetric chest expansion.   Gastrointestinal: Abdomen soft, non tender.   Genitourinary: Voiding independently.   Extremities: No peripheral edema. Moving all 4 extremities actively.   Psychological: Appropriate mood and behavior.   Neurological:   A&Ox3  BUE 5/5  BLE 5/5  Fcx4  +Oliver, hyperreflexia   Skin: surgical incision C/D/I, glue over incision, drain in place      Diagnostics   Results for orders placed or performed during the hospital encounter of 01/14/25 (from the past 24 hours)   VERIFY ABO/Rh Group Test   Result Value Ref Range    ABO TYPE O     Rh TYPE POS    CBC   Result Value Ref Range    WBC 11.5 (H) 4.4 - 11.3 x10*3/uL    nRBC 0.0 0.0 - 0.0 /100 WBCs    RBC 4.89 4.50 - 5.90 x10*6/uL    Hemoglobin 14.5 13.5 - 17.5 g/dL    Hematocrit 42.5 41.0 - 52.0 %    MCV 87 80 - 100 fL    MCH 29.7 26.0 - 34.0 pg    MCHC 34.1 32.0 - 36.0 g/dL    RDW 12.0 11.5 - 14.5 %    Platelets 293 150 - 450 x10*3/uL   Basic metabolic panel   Result  Value Ref Range    Glucose 99 74 - 99 mg/dL    Sodium 138 136 - 145 mmol/L    Potassium 3.7 3.5 - 5.3 mmol/L    Chloride 103 98 - 107 mmol/L    Bicarbonate 24 21 - 32 mmol/L    Anion Gap 15 10 - 20 mmol/L    Urea Nitrogen 10 6 - 23 mg/dL    Creatinine 0.84 0.50 - 1.30 mg/dL    eGFR >90 >60 mL/min/1.73m*2    Calcium 9.7 8.6 - 10.6 mg/dL       Current Medications  Scheduled medications  acetaminophen, 650 mg, oral, q6h  cyclobenzaprine, 10 mg, oral, TID  heparin (porcine), 5,000 Units, subcutaneous, q8h  polyethylene glycol, 17 g, oral, BID  sennosides-docusate sodium, 2 tablet, oral, BID         PRN medications  PRN medications: benzocaine-menthol, benzocaine-menthol, dextrose, dextrose, glucagon, glucagon, HYDROmorphone, naloxone, ondansetron **OR** ondansetron, oxyCODONE, oxyCODONE, oxyCODONE, oxygen     Assessment/Plan  Aryan Woods is a 44 y.o. male with a past medical history of HTN, HLD, anxiety. He presented with L>R C5 numbness/radiculopathy with a large C4-5 disc herniation  1/14 s/p C4-5 ACDF    # S/p C4-5 ACDF 1/14/25   - Drain in place, possible removal today   - monitor output Q8hr  - Upright XR today  - PT/OT eval post op  - Monitor Neuro checks Q4hrs  - Monitor VS/pulse ox Q4hrs   - Monitor AM CBC/RFP    # Acute postoperative pain  - Well controlled per patient, continue current regimen  -Scheduled Tylenol 650mg PO [id number]   -Oxycodone 2.5/5/10mg PO [id number] PRN mild/mod/severe pain   -Flexeril 10mg TID scheduled  -IV Dilaudid 0.2mg PRN for breakthrough pain  - Bowel regimen while using narcotics  - IV Zofran PRN nausea due to narcotics   - Pain assessments [id number]     # Hx of HTN  - Currently stable, not on any home meds  - Continue to monitor BP    Prophylaxis:   - DVT: SQH Q8hr, SCDs, encourage ambulation   - Encourage IS x10 every hour while awake     FEN/GI:  - Monitor/replete electrolytes PRN   - Continue regular/soft bite size diet   - GI protection with Protonix 40mg daily   - Bowel  regimen: Scheduled Miralax and pericolace daily        Disposition: Dispo pending PTOT eval    Patient and plan discussed with Dr. Garcia and Chief resident Dr. Mel Maria, [facility]  Neurosurgery  [place]  Team Pager #[phone]    Total face to face time spent with patient/family of > 60 minutes, with >50% of the time spent discussing plan of care/management, counseling/educating on disease processes, explaining results of diagnostic testing.     A-T Advancement Flap Text: The defect edges were debeveled with a #15 scalpel blade.  Given the location of the defect, shape of the defect and the proximity to free margins an A-T advancement flap was deemed most appropriate.  Using a sterile surgical marker, an appropriate advancement flap was drawn incorporating the defect and placing the expected incisions within the relaxed skin tension lines where possible.    The area thus outlined was incised deep to adipose tissue with a #15 scalpel blade.  The skin margins were undermined to an appropriate distance in all directions utilizing iris scissors.

## 2025-01-15 NOTE — PROGRESS NOTES
"  Pharmacy Medication History Review    Aryan Woods \"Viral\" is a 44 y.o. male admitted for Cervical myelopathy. Pharmacy reviewed the patient's jjluc-hd-djofqctdf medications and allergies for accuracy.    The list below reflects the updated PTA list.   Prior to Admission Medications   Prescriptions Last Dose Informant   ergocalciferol (Vitamin D-2) 1.25 MG (13010 UT) capsule Past Week Self   Sig: Take 1 capsule (50,000 Units) by mouth 1 (one) time per week. Pt takes on Mondays      Facility-Administered Medications: None        The list below reflects the updated allergy list. Please review each documented allergy for additional clarification and justification.  Allergies  Reviewed by José Miguel Mendes PharmD on 1/15/2025        Severity Reactions Comments    Bee Venom Protein (honey Bee) High Anaphylaxis Patient states he gets swollen and has shortness of breath.            Patient declines M2B at discharge. Pharmacy has been updated to [facility] in [place].    Sources used to complete the med history include:    [facility]  Pharmacy dispense history  Patient interview Good historian  Chart Review  Care Everywhere     Below are additional concerns with the patient's PTA list.  NA    Medications ADDED:  none  Medications CHANGED:  none  Medications REMOVED:   none    José Miguel Mendes PharmD  Transitions of Care Pharmacist  [facility]  Please reach out via Secure Chat for questions, or if no response call [employer] or [facility]    "

## 2025-01-15 NOTE — ANESTHESIA PREPROCEDURE EVALUATION
"Patient: Aryan Woods \"Viral\"    Procedure Information       Anesthesia Start Date/Time: 01/14/25 1100    Procedure: DISCECTOMY, SPINE, CERVICAL, ANTERIOR APPROACH, WITH FUSION (Neck)    Location: [facility] OR 23 / [facility]    Surgeons: Shaheen Garcia MD            Relevant Problems   Cardiac   (+) HLD (hyperlipidemia)      Neuro   (+) Anxiety       Clinical information reviewed:   Tobacco  Allergies  Meds   Med Hx  Surg Hx   Fam Hx  Soc Hx        NPO Detail:  NPO/Void Status  Carbohydrate Drink Given Prior to Surgery? : N  Date of Last Liquid: 01/14/25  Time of Last Liquid: 0530  Date of Last Solid: 01/13/25  Time of Last Solid: 2000  Last Intake Type: Clear fluids; Light meal  Time of Last Void: 0948         Physical Exam    Airway  Mallampati: II  TM distance: >3 FB  Neck ROM: full     Cardiovascular - normal exam     Dental    Pulmonary - normal exam     Abdominal            Anesthesia Plan    History of general anesthesia?: yes  History of complications of general anesthesia?: no    ASA 2     general     intravenous induction   Anesthetic plan and risks discussed with patient.  Use of blood products discussed with patient who.    Plan discussed with CAA and attending.      "

## 2025-01-15 NOTE — CARE PLAN
The patient's goals for the shift include  keeping pain under control     The clinical goals for the shift include Remain neurologically intact throughout the shift.        Problem: Pain - Adult  Goal: Verbalizes/displays adequate comfort level or baseline comfort level  Reactivated     Problem: Safety - Adult  Goal: Free from fall injury  Reactivated     Problem: Discharge Planning  Goal: Discharge to home or other facility with appropriate resources  Reactivated     Problem: Chronic Conditions and Co-morbidities  Goal: Patient's chronic conditions and co-morbidity symptoms are monitored and maintained or improved  Reactivated

## 2025-01-15 NOTE — DISCHARGE SUMMARY
Discharge Diagnosis  Cervical myelopathy    Issues Requiring Follow-Up  Standard post op care    Test Results Pending At Discharge  Pending Labs       No current pending labs.            Hospital Course  Aryan Woods is a 44 y.o. male with a past medical history of HTN, HLD, anxiety p/w L>R C5 numbness/radiculopathy with a large C4-5 disc herniation  1/14 s/p C4-5 ACDF  1/15 drain d/c'd, upright XR hardware in position    PT/OT evaluated patient and recommended no further therapy at discharge.      On the day of discharge, the patient was seen and evaluated by the neurosurgery team and deemed suitable for discharge. The patient was given detailed discharge instructions and were scheduled to follow up as an outpatient.      Pertinent Physical Exam At Time of Discharge  Physical Exam  HENT:      Head: Normocephalic.   Eyes:      Pupils: Pupils are equal, round, and reactive to light.   Neck:      Comments: Surgical incision C/D/I, glue in place  Cardiovascular:      Pulses: Normal pulses.   Pulmonary:      Effort: Pulmonary effort is normal.   Abdominal:      Palpations: Abdomen is soft.   Musculoskeletal:         General: Normal range of motion.   Neurological:      Mental Status: He is alert and oriented to person, place, and time.      Comments: BUE 5/5  BLE 5/5  Fcx4  +Oliver, hyperreflexia          Home Medications     Medication List      START taking these medications     cyclobenzaprine 10 mg tablet; Commonly known as: Flexeril; Take 1 tablet   (10 mg) by mouth 3 times a day as needed for muscle spasms.   oxyCODONE 5 mg immediate release tablet; Commonly known as: Roxicodone;   Take 1 tablet (5 mg) by mouth every 6 hours if needed for severe pain (7 -   10).   polyethylene glycol 17 gram packet; Commonly known as: Glycolax,   Miralax; Take 17 g by mouth 2 times a day. FOR CONSTIPATION WHILE TAKING   PAIN MEDICATION   sennosides-docusate sodium 8.6-50 mg tablet; Commonly known as:   Paula-Colace; Take 2  tablets by mouth 2 times a day. FOR CONSTIPATION WHILE   TAKING PAIN MEDICATION     CONTINUE taking these medications     ergocalciferol 1.25 MG (77037 UT) capsule; Commonly known as: Vitamin   D-2; Take 1 capsule (50,000 Units) by mouth 1 (one) time per week.       Outpatient Follow-Up  Future Appointments   Date Time Provider Department Center   2/5/2025 10:45 AM NEUROSURGERY [facility] NURSE [id number] [facility]   3/3/2025  2:30 PM Shaheen Garcia MD [id number] [facility]   11/25/2025  4:00 PM Dell Cisneros MD [id number] [facility]       Dorothy Maria, APRN-CNP  Total face to face time spent with patient/family of 30 minutes, with >50% of the time spent discussing plan of care/management, counseling/educating on disease processes, explaining results of diagnostic testing.

## 2025-01-15 NOTE — PROGRESS NOTES
01/15/25 1200   Discharge Planning   Living Arrangements Alone   Support Systems Parent   Assistance Needed None   Type of Residence Private residence   Number of Stairs to Enter Residence 4   Number of Stairs Within Residence 12   Do you have animals or pets at home? No   Who is requesting discharge planning? Provider   Home or Post Acute Services None   Expected Discharge Disposition Home   Does the patient need discharge transport arranged? No   Financial Resource Strain   How hard is it for you to pay for the very basics like food, housing, medical care, and heating? Not hard   Housing Stability   In the last 12 months, was there a time when you were not able to pay the mortgage or rent on time? N   In the past 12 months, how many times have you moved where you were living? 0   At any time in the past 12 months, were you homeless or living in a shelter (including now)? N   Transportation Needs   In the past 12 months, has lack of transportation kept you from medical appointments or from getting medications? no   In the past 12 months, has lack of transportation kept you from meetings, work, or from getting things needed for daily living? No   Stroke Family Assessment   Stroke Family Assessment Needed No   Intensity of Service   Intensity of Service 0-30 min     I spoke with Aryan regarding discharge planning and home going needs. Patient states that he lives home alone where he is independent with ADL's without assistive devices. Patient is medically cleared for discharge home no needs via private transportation with all personal belongings. I will continue to follow until discharged.

## 2025-02-04 ENCOUNTER — TELEPHONE (OUTPATIENT)
Dept: NEUROSURGERY | Facility: HOSPITAL | Age: 45
End: 2025-02-04
Payer: COMMERCIAL

## 2025-02-04 NOTE — TELEPHONE ENCOUNTER
Talked to pt and he said that his cervical incision is with out redness, swelling or drainage. I discussed wound care and activity. He asked about returning to work, I told him I would discuss with Dr. Garcia He will see him on 3/3.

## 2025-02-05 ENCOUNTER — APPOINTMENT (OUTPATIENT)
Dept: NEUROSURGERY | Facility: HOSPITAL | Age: 45
End: 2025-02-05
Payer: COMMERCIAL

## 2025-03-03 ENCOUNTER — APPOINTMENT (OUTPATIENT)
Dept: NEUROSURGERY | Facility: CLINIC | Age: 45
End: 2025-03-03
Payer: COMMERCIAL

## 2025-03-03 VITALS
HEART RATE: 82 BPM | RESPIRATION RATE: 18 BRPM | BODY MASS INDEX: 27.59 KG/M2 | SYSTOLIC BLOOD PRESSURE: 134 MMHG | HEIGHT: 69 IN | DIASTOLIC BLOOD PRESSURE: 95 MMHG | WEIGHT: 186.29 LBS

## 2025-03-03 DIAGNOSIS — M50.90 CERVICAL DISC DISEASE: Primary | ICD-10-CM

## 2025-03-03 PROCEDURE — 3008F BODY MASS INDEX DOCD: CPT | Performed by: NEUROLOGICAL SURGERY

## 2025-03-03 PROCEDURE — 1036F TOBACCO NON-USER: CPT | Performed by: NEUROLOGICAL SURGERY

## 2025-03-03 PROCEDURE — 99024 POSTOP FOLLOW-UP VISIT: CPT | Performed by: NEUROLOGICAL SURGERY

## 2025-03-03 ASSESSMENT — PAIN SCALES - GENERAL: PAINLEVEL_OUTOF10: 0-NO PAIN

## 2025-05-19 ENCOUNTER — APPOINTMENT (OUTPATIENT)
Dept: NEUROSURGERY | Facility: CLINIC | Age: 45
End: 2025-05-19
Payer: COMMERCIAL

## 2025-05-19 VITALS — RESPIRATION RATE: 17 BRPM | HEART RATE: 69 BPM | SYSTOLIC BLOOD PRESSURE: 154 MMHG | DIASTOLIC BLOOD PRESSURE: 107 MMHG

## 2025-05-19 DIAGNOSIS — M47.12 CERVICAL SPONDYLOSIS WITH MYELOPATHY: Primary | ICD-10-CM

## 2025-05-19 PROCEDURE — 99212 OFFICE O/P EST SF 10 MIN: CPT | Performed by: NEUROLOGICAL SURGERY

## 2025-05-19 ASSESSMENT — PAIN SCALES - GENERAL: PAINLEVEL_OUTOF10: 0-NO PAIN

## 2025-05-19 NOTE — PROGRESS NOTES
1/14/25 C4-5 ACDF.  Today is better than before surgery. Still has left hand numbness and tingling.    44-year-old male returns for follow-up after cervical discectomy for myelopathy.  Overall, he is feeling well.  He has increased his activity and feels he is back to his usual activities.  He continues to have a little bit of left hand numbness.  He denies weakness or pain.    On physical exam, the patient is alert and interactive.  His incision is well-healed.  He moves all extremities with good strength.    Patient has recovered well from his surgery and had a good response to cervical decompression.  I be happy to see him again if any new issues arise.

## 2025-06-02 ENCOUNTER — APPOINTMENT (OUTPATIENT)
Dept: NEUROSURGERY | Facility: CLINIC | Age: 45
End: 2025-06-02
Payer: COMMERCIAL

## (undated) DEVICE — SPONGE, DISSECTOR, PEANUT, 3/8, STERILE 5 FOAM HOLDER"

## (undated) DEVICE — GOWN, SURGICAL, SMARTGOWN, XLARGE, STERILE

## (undated) DEVICE — DRAIN, WOUND, ROUND, PERFORATED, W/TROCAR, W/ADAPTOR, SMALL, 0.09375 IN

## (undated) DEVICE — Device

## (undated) DEVICE — REST, HEAD, BAGEL, 9 IN

## (undated) DEVICE — DRESSING, MEPILEX, BORDER, HEEL, 8.7 X 9.1 IN

## (undated) DEVICE — ADHESIVE, SKIN, DERMABOND ADVANCED, 15CM, PEN-STYLE

## (undated) DEVICE — MARKER, SKIN, RULER AND LABEL PACK, CUSTOM

## (undated) DEVICE — SUTURE, SURGIPRO 3/0  18  BLUE  P-12"

## (undated) DEVICE — CATHETER TRAY, SURESTEP, 16FR, URINE METER W/STATLOCK

## (undated) DEVICE — SUTURE, VICRYL, 3-0,18 IN, SH, UNDYED

## (undated) DEVICE — TUBING, SMOKE EVAC, 3/8 X 10 FT

## (undated) DEVICE — COVER, TABLE, UHC

## (undated) DEVICE — BUR, 3MM X 3.8MM, PRECISION, NEURO DRILL

## (undated) DEVICE — COVER, CART, 45 X 27 X 48 IN, CLEAR

## (undated) DEVICE — ELECTRODE, CORKSCREW NEEDLE 1.5M LENGTH

## (undated) DEVICE — DRESSING, MEPILEX, BORDER, SACRUM, 8.7 X 9.8 IN

## (undated) DEVICE — PAD, GROUNDING, ELECTROSURGICAL, W/9 FT CABLE, POLYHESIVE II, ADULT, LF

## (undated) DEVICE — MANIFOLD, 4 PORT NEPTUNE STANDARD

## (undated) DEVICE — SEALANT, HEMOSTATIC, FLOSEAL, 10 ML

## (undated) DEVICE — ELECTRODE, GROUND PLATE

## (undated) DEVICE — NEEDLE, ELECTRODE, SUBDERMAL,SINGLE, 200CM LEAD, DISP

## (undated) DEVICE — EVACUATOR, WOUND, SUCTION, CLOSED, JACKSON-PRATT, 100 CC, SILICONE

## (undated) DEVICE — APPLICATOR, PREP, CHLORAPREP, W/ORANGE TINT, 10.5ML

## (undated) DEVICE — DRAPE, INCISE, DIRECTIONAL, FENESTRATED, PEDIATRIC, STERILE

## (undated) DEVICE — DRAPE, MICROSCOPE, FOR ZEISS 65MM, VARI-LENS II, 52 X 150

## (undated) DEVICE — NEEDLE, ELECTRODE, SUBDERMAL, PAIRED, 2.0 LEAD, DISP